# Patient Record
Sex: FEMALE | Race: OTHER | HISPANIC OR LATINO | URBAN - METROPOLITAN AREA
[De-identification: names, ages, dates, MRNs, and addresses within clinical notes are randomized per-mention and may not be internally consistent; named-entity substitution may affect disease eponyms.]

---

## 2017-07-14 ENCOUNTER — EMERGENCY (EMERGENCY)
Facility: HOSPITAL | Age: 32
LOS: 1 days | Discharge: HOME | End: 2017-07-14
Attending: EMERGENCY MEDICINE | Admitting: EMERGENCY MEDICINE
Payer: COMMERCIAL

## 2017-07-14 VITALS
DIASTOLIC BLOOD PRESSURE: 80 MMHG | SYSTOLIC BLOOD PRESSURE: 121 MMHG | OXYGEN SATURATION: 100 % | RESPIRATION RATE: 18 BRPM | HEART RATE: 82 BPM | TEMPERATURE: 98 F

## 2017-07-14 VITALS — WEIGHT: 139.99 LBS | HEIGHT: 61 IN

## 2017-07-14 PROCEDURE — 73560 X-RAY EXAM OF KNEE 1 OR 2: CPT

## 2017-07-14 PROCEDURE — T1013: CPT

## 2017-07-14 PROCEDURE — 99283 EMERGENCY DEPT VISIT LOW MDM: CPT

## 2017-07-14 PROCEDURE — 73560 X-RAY EXAM OF KNEE 1 OR 2: CPT | Mod: 26,RT

## 2017-07-14 RX ORDER — IBUPROFEN 200 MG
1 TABLET ORAL
Qty: 15 | Refills: 0 | OUTPATIENT
Start: 2017-07-14 | End: 2017-07-19

## 2017-07-14 RX ORDER — IBUPROFEN 200 MG
600 TABLET ORAL ONCE
Qty: 0 | Refills: 0 | Status: COMPLETED | OUTPATIENT
Start: 2017-07-14 | End: 2017-07-14

## 2017-07-14 RX ADMIN — Medication 600 MILLIGRAM(S): at 19:36

## 2017-07-14 NOTE — ED STATDOCS - ATTENDING CONTRIBUTION TO CARE
I, Brigitte Mir, performed the initial face to face bedside interview with this patient regarding history of present illness, review of symptoms and relevant past medical, social and family history.  I completed an independent physical examination.  I was the initial provider who evaluated this patient. I have signed out the follow up of any pending tests (i.e. labs, radiological studies) to the ACP.  I have communicated the patient’s plan of care and disposition with the ACP.
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
## 2017-07-14 NOTE — ED STATDOCS - PROGRESS NOTE DETAILS
pt seen and evaluated by intake doctor. agree with intake hpi and pe. x ray reviewed. pt given rx for ibuprofen for pain. for advised to follow up with pmd. pt verbalized understanding and agreement with plan and dx will dc

## 2017-07-14 NOTE — ED STATDOCS - CHPI ED SYMPTOM NEG
no nausea/no LOC, numbness, tingling, HA, SOB, CP or abd pain/no vomiting no LOC, numbness, tingling, SOB, CP or abd pain/no vomiting/no nausea

## 2017-10-11 ENCOUNTER — ASOB RESULT (OUTPATIENT)
Age: 32
End: 2017-10-11

## 2017-10-11 ENCOUNTER — APPOINTMENT (OUTPATIENT)
Dept: ANTEPARTUM | Facility: CLINIC | Age: 32
End: 2017-10-11
Payer: MEDICAID

## 2017-10-11 PROCEDURE — 76830 TRANSVAGINAL US NON-OB: CPT

## 2017-10-11 PROCEDURE — 76857 US EXAM PELVIC LIMITED: CPT

## 2018-07-13 ENCOUNTER — EMERGENCY (EMERGENCY)
Facility: HOSPITAL | Age: 33
LOS: 1 days | Discharge: HOME | End: 2018-07-13
Attending: EMERGENCY MEDICINE
Payer: COMMERCIAL

## 2018-07-13 VITALS
WEIGHT: 138.01 LBS | SYSTOLIC BLOOD PRESSURE: 111 MMHG | OXYGEN SATURATION: 99 % | HEIGHT: 60 IN | HEART RATE: 71 BPM | TEMPERATURE: 98 F | RESPIRATION RATE: 18 BRPM | DIASTOLIC BLOOD PRESSURE: 72 MMHG

## 2018-07-13 LAB
ALBUMIN SERPL ELPH-MCNC: 4.5 G/DL — SIGNIFICANT CHANGE UP (ref 3.3–5.2)
ALP SERPL-CCNC: 52 U/L — SIGNIFICANT CHANGE UP (ref 40–120)
ALT FLD-CCNC: 18 U/L — SIGNIFICANT CHANGE UP
ANION GAP SERPL CALC-SCNC: 13 MMOL/L — SIGNIFICANT CHANGE UP (ref 5–17)
APPEARANCE UR: CLEAR — SIGNIFICANT CHANGE UP
AST SERPL-CCNC: 33 U/L — HIGH
BASOPHILS # BLD AUTO: 0 K/UL — SIGNIFICANT CHANGE UP (ref 0–0.2)
BASOPHILS NFR BLD AUTO: 0.1 % — SIGNIFICANT CHANGE UP (ref 0–2)
BILIRUB SERPL-MCNC: 0.8 MG/DL — SIGNIFICANT CHANGE UP (ref 0.4–2)
BILIRUB UR-MCNC: NEGATIVE — SIGNIFICANT CHANGE UP
BLD GP AB SCN SERPL QL: SIGNIFICANT CHANGE UP
BUN SERPL-MCNC: 15 MG/DL — SIGNIFICANT CHANGE UP (ref 8–20)
CALCIUM SERPL-MCNC: 9.4 MG/DL — SIGNIFICANT CHANGE UP (ref 8.6–10.2)
CHLORIDE SERPL-SCNC: 98 MMOL/L — SIGNIFICANT CHANGE UP (ref 98–107)
CO2 SERPL-SCNC: 23 MMOL/L — SIGNIFICANT CHANGE UP (ref 22–29)
COLOR SPEC: YELLOW — SIGNIFICANT CHANGE UP
CREAT SERPL-MCNC: 0.42 MG/DL — LOW (ref 0.5–1.3)
DIFF PNL FLD: ABNORMAL
EOSINOPHIL # BLD AUTO: 0 K/UL — SIGNIFICANT CHANGE UP (ref 0–0.5)
EOSINOPHIL NFR BLD AUTO: 0.3 % — SIGNIFICANT CHANGE UP (ref 0–6)
EPI CELLS # UR: SIGNIFICANT CHANGE UP
GLUCOSE SERPL-MCNC: 115 MG/DL — SIGNIFICANT CHANGE UP (ref 70–115)
GLUCOSE UR QL: NEGATIVE MG/DL — SIGNIFICANT CHANGE UP
HCG SERPL-ACNC: SIGNIFICANT CHANGE UP MIU/ML
HCT VFR BLD CALC: 39.6 % — SIGNIFICANT CHANGE UP (ref 37–47)
HGB BLD-MCNC: 13.2 G/DL — SIGNIFICANT CHANGE UP (ref 12–16)
KETONES UR-MCNC: ABNORMAL
LEUKOCYTE ESTERASE UR-ACNC: NEGATIVE — SIGNIFICANT CHANGE UP
LYMPHOCYTES # BLD AUTO: 1.7 K/UL — SIGNIFICANT CHANGE UP (ref 1–4.8)
LYMPHOCYTES # BLD AUTO: 20.2 % — SIGNIFICANT CHANGE UP (ref 20–55)
MCHC RBC-ENTMCNC: 29.9 PG — SIGNIFICANT CHANGE UP (ref 27–31)
MCHC RBC-ENTMCNC: 33.3 G/DL — SIGNIFICANT CHANGE UP (ref 32–36)
MCV RBC AUTO: 89.6 FL — SIGNIFICANT CHANGE UP (ref 81–99)
MONOCYTES # BLD AUTO: 0.4 K/UL — SIGNIFICANT CHANGE UP (ref 0–0.8)
MONOCYTES NFR BLD AUTO: 4.3 % — SIGNIFICANT CHANGE UP (ref 3–10)
NEUTROPHILS # BLD AUTO: 6.4 K/UL — SIGNIFICANT CHANGE UP (ref 1.8–8)
NEUTROPHILS NFR BLD AUTO: 74.8 % — HIGH (ref 37–73)
NITRITE UR-MCNC: NEGATIVE — SIGNIFICANT CHANGE UP
PH UR: 6 — SIGNIFICANT CHANGE UP (ref 5–8)
PLATELET # BLD AUTO: 256 K/UL — SIGNIFICANT CHANGE UP (ref 150–400)
POTASSIUM SERPL-MCNC: 4.7 MMOL/L — SIGNIFICANT CHANGE UP (ref 3.5–5.3)
POTASSIUM SERPL-SCNC: 4.7 MMOL/L — SIGNIFICANT CHANGE UP (ref 3.5–5.3)
PROT SERPL-MCNC: 8 G/DL — SIGNIFICANT CHANGE UP (ref 6.6–8.7)
PROT UR-MCNC: NEGATIVE MG/DL — SIGNIFICANT CHANGE UP
RBC # BLD: 4.42 M/UL — SIGNIFICANT CHANGE UP (ref 4.4–5.2)
RBC # FLD: 12.4 % — SIGNIFICANT CHANGE UP (ref 11–15.6)
RBC CASTS # UR COMP ASSIST: SIGNIFICANT CHANGE UP /HPF (ref 0–4)
SODIUM SERPL-SCNC: 134 MMOL/L — LOW (ref 135–145)
SP GR SPEC: 1.02 — SIGNIFICANT CHANGE UP (ref 1.01–1.02)
TYPE + AB SCN PNL BLD: SIGNIFICANT CHANGE UP
UROBILINOGEN FLD QL: NEGATIVE MG/DL — SIGNIFICANT CHANGE UP
WBC # BLD: 8.6 K/UL — SIGNIFICANT CHANGE UP (ref 4.8–10.8)
WBC # FLD AUTO: 8.6 K/UL — SIGNIFICANT CHANGE UP (ref 4.8–10.8)
WBC UR QL: SIGNIFICANT CHANGE UP

## 2018-07-13 PROCEDURE — 80053 COMPREHEN METABOLIC PANEL: CPT

## 2018-07-13 PROCEDURE — 99284 EMERGENCY DEPT VISIT MOD MDM: CPT

## 2018-07-13 PROCEDURE — 76801 OB US < 14 WKS SINGLE FETUS: CPT | Mod: 26

## 2018-07-13 PROCEDURE — T1013: CPT

## 2018-07-13 PROCEDURE — 85027 COMPLETE CBC AUTOMATED: CPT

## 2018-07-13 PROCEDURE — 84702 CHORIONIC GONADOTROPIN TEST: CPT

## 2018-07-13 PROCEDURE — 81001 URINALYSIS AUTO W/SCOPE: CPT

## 2018-07-13 PROCEDURE — 76817 TRANSVAGINAL US OBSTETRIC: CPT | Mod: 26

## 2018-07-13 PROCEDURE — 86900 BLOOD TYPING SEROLOGIC ABO: CPT

## 2018-07-13 PROCEDURE — 86901 BLOOD TYPING SEROLOGIC RH(D): CPT

## 2018-07-13 PROCEDURE — 86850 RBC ANTIBODY SCREEN: CPT

## 2018-07-13 PROCEDURE — 76801 OB US < 14 WKS SINGLE FETUS: CPT

## 2018-07-13 PROCEDURE — 36415 COLL VENOUS BLD VENIPUNCTURE: CPT

## 2018-07-13 PROCEDURE — 76817 TRANSVAGINAL US OBSTETRIC: CPT

## 2018-07-13 NOTE — ED ADULT NURSE NOTE - OBJECTIVE STATEMENT
Patient arrived to ED today with c/o lower abdominal pain, vaginal bleeding and states she is 6 weeks pregnant.

## 2018-07-13 NOTE — ED PROVIDER NOTE - OBJECTIVE STATEMENT
33 year old female with no significant pMH presents with pelvic pain and vaginal bleeding. LMP 6/4, had a positive home pregnancy test, and reports that she has 33 year old female with no significant pMH presents with pelvic pain and vaginal bleeding. LMP 6/4, had a positive home pregnancy test, and reports that she has left pelvic cramping and spotting starting this morning. Sx constant, radiate to her back. No associated vomiting, diarrhea, dysuria, vaginal discharge

## 2018-07-13 NOTE — ED PROVIDER NOTE - CARE PLAN
Principal Discharge DX:	Threatened   Secondary Diagnosis:	Subchorionic hematoma in first trimester, single or unspecified fetus

## 2018-07-13 NOTE — ED PROVIDER NOTE - MEDICAL DECISION MAKING DETAILS
Pt well appearing. RH+, abd soft, no bleeding actively. Informed of findings and stable for outpt gyn f/u

## 2018-07-20 ENCOUNTER — ASOB RESULT (OUTPATIENT)
Age: 33
End: 2018-07-20

## 2018-07-20 ENCOUNTER — APPOINTMENT (OUTPATIENT)
Dept: ANTEPARTUM | Facility: CLINIC | Age: 33
End: 2018-07-20
Payer: MEDICAID

## 2018-07-20 PROCEDURE — 76817 TRANSVAGINAL US OBSTETRIC: CPT

## 2018-08-15 ENCOUNTER — APPOINTMENT (OUTPATIENT)
Dept: ANTEPARTUM | Facility: CLINIC | Age: 33
End: 2018-08-15

## 2018-08-15 ENCOUNTER — APPOINTMENT (OUTPATIENT)
Dept: MATERNAL FETAL MEDICINE | Facility: CLINIC | Age: 33
End: 2018-08-15

## 2018-08-22 ENCOUNTER — EMERGENCY (EMERGENCY)
Facility: HOSPITAL | Age: 33
LOS: 1 days | Discharge: HOME | End: 2018-08-22
Attending: EMERGENCY MEDICINE
Payer: COMMERCIAL

## 2018-08-22 VITALS
TEMPERATURE: 99 F | SYSTOLIC BLOOD PRESSURE: 110 MMHG | OXYGEN SATURATION: 99 % | DIASTOLIC BLOOD PRESSURE: 68 MMHG | HEART RATE: 89 BPM | RESPIRATION RATE: 18 BRPM

## 2018-08-22 VITALS — WEIGHT: 154.1 LBS | HEIGHT: 60 IN

## 2018-08-22 DIAGNOSIS — Z90.721 ACQUIRED ABSENCE OF OVARIES, UNILATERAL: Chronic | ICD-10-CM

## 2018-08-22 LAB
ALBUMIN SERPL ELPH-MCNC: 3.9 G/DL — SIGNIFICANT CHANGE UP (ref 3.3–5.2)
ALP SERPL-CCNC: 52 U/L — SIGNIFICANT CHANGE UP (ref 40–120)
ALT FLD-CCNC: 14 U/L — SIGNIFICANT CHANGE UP
ANION GAP SERPL CALC-SCNC: 14 MMOL/L — SIGNIFICANT CHANGE UP (ref 5–17)
APPEARANCE UR: ABNORMAL
APTT BLD: 25.6 SEC — LOW (ref 27.5–37.4)
AST SERPL-CCNC: 16 U/L — SIGNIFICANT CHANGE UP
BACTERIA # UR AUTO: ABNORMAL
BASOPHILS # BLD AUTO: 0 K/UL — SIGNIFICANT CHANGE UP (ref 0–0.2)
BASOPHILS NFR BLD AUTO: 0.1 % — SIGNIFICANT CHANGE UP (ref 0–2)
BILIRUB SERPL-MCNC: 0.3 MG/DL — LOW (ref 0.4–2)
BILIRUB UR-MCNC: NEGATIVE — SIGNIFICANT CHANGE UP
BUN SERPL-MCNC: 10 MG/DL — SIGNIFICANT CHANGE UP (ref 8–20)
CALCIUM SERPL-MCNC: 9.3 MG/DL — SIGNIFICANT CHANGE UP (ref 8.6–10.2)
CHLORIDE SERPL-SCNC: 99 MMOL/L — SIGNIFICANT CHANGE UP (ref 98–107)
CO2 SERPL-SCNC: 22 MMOL/L — SIGNIFICANT CHANGE UP (ref 22–29)
COLOR SPEC: YELLOW — SIGNIFICANT CHANGE UP
CREAT SERPL-MCNC: 0.37 MG/DL — LOW (ref 0.5–1.3)
DIFF PNL FLD: ABNORMAL
EOSINOPHIL # BLD AUTO: 0 K/UL — SIGNIFICANT CHANGE UP (ref 0–0.5)
EOSINOPHIL NFR BLD AUTO: 0.3 % — SIGNIFICANT CHANGE UP (ref 0–6)
EPI CELLS # UR: ABNORMAL
GLUCOSE SERPL-MCNC: 101 MG/DL — SIGNIFICANT CHANGE UP (ref 70–115)
GLUCOSE UR QL: NEGATIVE MG/DL — SIGNIFICANT CHANGE UP
HCG SERPL-ACNC: SIGNIFICANT CHANGE UP MIU/ML
HCG UR QL: POSITIVE
HCT VFR BLD CALC: 34.9 % — LOW (ref 37–47)
HGB BLD-MCNC: 11.8 G/DL — LOW (ref 12–16)
INR BLD: 0.97 RATIO — SIGNIFICANT CHANGE UP (ref 0.88–1.16)
KETONES UR-MCNC: ABNORMAL
LEUKOCYTE ESTERASE UR-ACNC: NEGATIVE — SIGNIFICANT CHANGE UP
LYMPHOCYTES # BLD AUTO: 1.7 K/UL — SIGNIFICANT CHANGE UP (ref 1–4.8)
LYMPHOCYTES # BLD AUTO: 19.2 % — LOW (ref 20–55)
MCHC RBC-ENTMCNC: 30.3 PG — SIGNIFICANT CHANGE UP (ref 27–31)
MCHC RBC-ENTMCNC: 33.8 G/DL — SIGNIFICANT CHANGE UP (ref 32–36)
MCV RBC AUTO: 89.5 FL — SIGNIFICANT CHANGE UP (ref 81–99)
MONOCYTES # BLD AUTO: 0.7 K/UL — SIGNIFICANT CHANGE UP (ref 0–0.8)
MONOCYTES NFR BLD AUTO: 8.3 % — SIGNIFICANT CHANGE UP (ref 3–10)
NEUTROPHILS # BLD AUTO: 6.3 K/UL — SIGNIFICANT CHANGE UP (ref 1.8–8)
NEUTROPHILS NFR BLD AUTO: 71.5 % — SIGNIFICANT CHANGE UP (ref 37–73)
NITRITE UR-MCNC: NEGATIVE — SIGNIFICANT CHANGE UP
PH UR: 6 — SIGNIFICANT CHANGE UP (ref 5–8)
PLATELET # BLD AUTO: 202 K/UL — SIGNIFICANT CHANGE UP (ref 150–400)
POTASSIUM SERPL-MCNC: 3.7 MMOL/L — SIGNIFICANT CHANGE UP (ref 3.5–5.3)
POTASSIUM SERPL-SCNC: 3.7 MMOL/L — SIGNIFICANT CHANGE UP (ref 3.5–5.3)
PROT SERPL-MCNC: 6.7 G/DL — SIGNIFICANT CHANGE UP (ref 6.6–8.7)
PROT UR-MCNC: NEGATIVE MG/DL — SIGNIFICANT CHANGE UP
PROTHROM AB SERPL-ACNC: 10.7 SEC — SIGNIFICANT CHANGE UP (ref 9.8–12.7)
RBC # BLD: 3.9 M/UL — LOW (ref 4.4–5.2)
RBC # FLD: 13.1 % — SIGNIFICANT CHANGE UP (ref 11–15.6)
RBC CASTS # UR COMP ASSIST: ABNORMAL /HPF (ref 0–4)
SODIUM SERPL-SCNC: 135 MMOL/L — SIGNIFICANT CHANGE UP (ref 135–145)
SP GR SPEC: 1.01 — SIGNIFICANT CHANGE UP (ref 1.01–1.02)
UROBILINOGEN FLD QL: NEGATIVE MG/DL — SIGNIFICANT CHANGE UP
WBC # BLD: 8.8 K/UL — SIGNIFICANT CHANGE UP (ref 4.8–10.8)
WBC # FLD AUTO: 8.8 K/UL — SIGNIFICANT CHANGE UP (ref 4.8–10.8)
WBC UR QL: SIGNIFICANT CHANGE UP

## 2018-08-22 PROCEDURE — 81001 URINALYSIS AUTO W/SCOPE: CPT

## 2018-08-22 PROCEDURE — 85730 THROMBOPLASTIN TIME PARTIAL: CPT

## 2018-08-22 PROCEDURE — 80053 COMPREHEN METABOLIC PANEL: CPT

## 2018-08-22 PROCEDURE — 36415 COLL VENOUS BLD VENIPUNCTURE: CPT

## 2018-08-22 PROCEDURE — 84443 ASSAY THYROID STIM HORMONE: CPT

## 2018-08-22 PROCEDURE — 85027 COMPLETE CBC AUTOMATED: CPT

## 2018-08-22 PROCEDURE — 85610 PROTHROMBIN TIME: CPT

## 2018-08-22 PROCEDURE — 93005 ELECTROCARDIOGRAM TRACING: CPT

## 2018-08-22 PROCEDURE — T1013: CPT

## 2018-08-22 PROCEDURE — 99283 EMERGENCY DEPT VISIT LOW MDM: CPT

## 2018-08-22 PROCEDURE — 87086 URINE CULTURE/COLONY COUNT: CPT

## 2018-08-22 PROCEDURE — 93010 ELECTROCARDIOGRAM REPORT: CPT

## 2018-08-22 PROCEDURE — 84702 CHORIONIC GONADOTROPIN TEST: CPT

## 2018-08-22 PROCEDURE — 99284 EMERGENCY DEPT VISIT MOD MDM: CPT

## 2018-08-22 PROCEDURE — 81025 URINE PREGNANCY TEST: CPT

## 2018-08-22 NOTE — ED ADULT NURSE NOTE - NSIMPLEMENTINTERV_GEN_ALL_ED
Implemented All Universal Safety Interventions:  [place] to call system. Call bell, personal items and telephone within reach. Instruct patient to call for assistance. Room bathroom lighting operational. Non-slip footwear when patient is off stretcher. Physically safe environment: no spills, clutter or unnecessary equipment. Stretcher in lowest position, wheels locked, appropriate side rails in place.

## 2018-08-22 NOTE — ED PROVIDER NOTE - ATTENDING CONTRIBUTION TO CARE
I personally saw the patient with the resident, and completed the key components of the history and physical exam. I then discussed the management plan with the resident.  33 year old female at 11 weeks gestation with twin gestation presents with episode of palpitations, nausea, and lightheadedness which has since resolved. no complaints currently, no abd pain/vag bleeding.  Gen: NAD, well appearing  CV: RRR  Pul: CTA b/l  Abd: Soft, non-distended, non-tender  Neuro: no focal deficits  Pt improved, stable for dc

## 2018-08-22 NOTE — ED PROVIDER NOTE - PLAN OF CARE
To determine etiology of palpitations Cardiac vs metabolic vs anemia  Pending CBC, CMP, UA, Urine Cx, TSH  EKG shows NSR

## 2018-08-22 NOTE — ED PROVIDER NOTE - MEDICAL DECISION MAKING DETAILS
Pt states she feels much better,. HR has reamined in NSr entire time here, well apeparing, labs neg, stable for dc

## 2018-08-22 NOTE — ED PROVIDER NOTE - OBJECTIVE STATEMENT
34 yo female with PMH of left oopherectomy presents at 11 weeks pregnancy with twin gestation presents after an episode of having palpitations, fast heart beat, feeling of throat closes, and weakness. 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 She states she had an episode of NBNB vomiting after this. She also had a right sided headache which last 1 hour but resolved after taking a Tylenol. She said her friend checked her blood glucose via fingerstick at that time which was 156. She states this has never happened before and has not had any complication with her previous pregnancy. She was found to have a subchorionic bleeding at 5 weeks after she began to have vaginal bleeding at 4 weeks. She endorses +fetal movements today and denies any vaginal bleeding or discharge.  She denies any dizziness, LOC, fall, trauma, CP or abd pain.

## 2018-08-22 NOTE — ED PROVIDER NOTE - PHYSICAL EXAMINATION
Vitals  T(C): 37.3 (08-22-18 @ 12:43), Max: 37.3 (08-22-18 @ 12:43)  HR: 92 (08-22-18 @ 12:43) (92 - 92)  BP: 108/67 (08-22-18 @ 12:43) (108/67 - 108/67)  RR: 16 (08-22-18 @ 12:43) (16 - 16)  SpO2: 98% (08-22-18 @ 12:43) (98% - 98%)    Physical Exam:   GENERAL: Pregnant female, NAD, well-groomed, well-developed  HEAD:  Atraumatic, Normocephalic  EYES: EOMI, PERRLA, conjunctiva and sclera clear  ENMT: Moist mucous membranes, Good dentition  NECK: Supple, No JVD, Normal thyroid  NERVOUS SYSTEM:  Alert & Oriented X3, Good concentration; Motor Strength 5/5 B/L upper and lower extremities;   RESP: Clear to auscultation bilaterally; No rales, rhonchi, wheezing, or rubs  CVS: Regular rate and rhythm; No murmurs, rubs, or gallops  GI: Gravid, Soft, Nontender, Bowel sounds present  EXTREMITIES:  2+ Peripheral Pulses, No clubbing, cyanosis, or edema  SKIN: No rashes or lesions

## 2018-08-22 NOTE — ED PROVIDER NOTE - PMH
Medication: Pantoprazole 40 mg PO BID  Last office visit:  11/15/2016  Follow-up:  11/28/2017    Script was eprescribed to pharmacy per Dr. Serrano.       Back pain

## 2018-08-22 NOTE — ED ADULT NURSE NOTE - BOWEL SOUNDS LUQ
Zarxio initiated  daily CBC with diff ordered  if develops fever, will order one dose of vancomycin and present

## 2018-08-22 NOTE — ED PROVIDER NOTE - CARE PLAN
Principal Discharge DX:	Palpitations  Goal:	To determine etiology of palpitations  Assessment and plan of treatment:	Cardiac vs metabolic vs anemia  Pending CBC, CMP, UA, Urine Cx, TSH  EKG shows NSR  Secondary Diagnosis:	11 weeks gestation of pregnancy

## 2018-08-23 LAB
CULTURE RESULTS: SIGNIFICANT CHANGE UP
SPECIMEN SOURCE: SIGNIFICANT CHANGE UP

## 2018-09-12 PROBLEM — M54.9 DORSALGIA, UNSPECIFIED: Chronic | Status: ACTIVE | Noted: 2018-08-22

## 2018-09-25 ENCOUNTER — ASOB RESULT (OUTPATIENT)
Age: 33
End: 2018-09-25

## 2018-09-25 ENCOUNTER — APPOINTMENT (OUTPATIENT)
Dept: ANTEPARTUM | Facility: CLINIC | Age: 33
End: 2018-09-25
Payer: MEDICAID

## 2018-09-25 ENCOUNTER — APPOINTMENT (OUTPATIENT)
Dept: MATERNAL FETAL MEDICINE | Facility: CLINIC | Age: 33
End: 2018-09-25
Payer: MEDICAID

## 2018-09-25 VITALS
BODY MASS INDEX: 31.26 KG/M2 | OXYGEN SATURATION: 98 % | RESPIRATION RATE: 16 BRPM | SYSTOLIC BLOOD PRESSURE: 92 MMHG | DIASTOLIC BLOOD PRESSURE: 58 MMHG | HEIGHT: 60 IN | HEART RATE: 96 BPM | WEIGHT: 159.25 LBS

## 2018-09-25 DIAGNOSIS — O31.21X0 CONTINUING PREGNANCY AFTER INTRAUTERINE DEATH OF ONE FETUS OR MORE, FIRST TRIMESTER, NOT APPLICABLE OR UNSPECIFIED: ICD-10-CM

## 2018-09-25 DIAGNOSIS — O30.049 TWIN PREGNANCY, DICHORIONIC/DIAMNIOTIC, UNSPECIFIED TRIMESTER: ICD-10-CM

## 2018-09-25 DIAGNOSIS — N83.202 UNSPECIFIED OVARIAN CYST, LEFT SIDE: ICD-10-CM

## 2018-09-25 DIAGNOSIS — O99.212 OBESITY COMPLICATING PREGNANCY, SECOND TRIMESTER: ICD-10-CM

## 2018-09-25 DIAGNOSIS — Z87.59 PERSONAL HISTORY OF OTHER COMPLICATIONS OF PREGNANCY, CHILDBIRTH AND THE PUERPERIUM: ICD-10-CM

## 2018-09-25 DIAGNOSIS — Z3A.16 16 WEEKS GESTATION OF PREGNANCY: ICD-10-CM

## 2018-09-25 DIAGNOSIS — Z83.3 FAMILY HISTORY OF DIABETES MELLITUS: ICD-10-CM

## 2018-09-25 PROCEDURE — 76816 OB US FOLLOW-UP PER FETUS: CPT | Mod: 59

## 2018-09-25 PROCEDURE — 99215 OFFICE O/P EST HI 40 MIN: CPT

## 2018-09-25 RX ORDER — VITAMIN C, CALCIUM, IRON, VITAMIN D3, VITAMIN E, VITAMIN B1, VITAMIN B2, VITAMIN B3, VITAMIN B6, FOLIC ACID, IODINE, ZINC, COPPER, DOCUSATE SODIUM, DOCOSAHEXAENOIC ACID (DHA) 27-1-50 MG
KIT ORAL
Refills: 0 | Status: ACTIVE | COMMUNITY

## 2018-09-27 ENCOUNTER — LABORATORY RESULT (OUTPATIENT)
Age: 33
End: 2018-09-27

## 2018-09-27 ENCOUNTER — ASOB RESULT (OUTPATIENT)
Age: 33
End: 2018-09-27

## 2018-09-27 ENCOUNTER — APPOINTMENT (OUTPATIENT)
Dept: MATERNAL FETAL MEDICINE | Facility: CLINIC | Age: 33
End: 2018-09-27
Payer: MEDICAID

## 2018-09-27 ENCOUNTER — EMERGENCY (EMERGENCY)
Facility: HOSPITAL | Age: 33
LOS: 1 days | Discharge: HOME | End: 2018-09-27
Attending: EMERGENCY MEDICINE
Payer: COMMERCIAL

## 2018-09-27 VITALS
SYSTOLIC BLOOD PRESSURE: 114 MMHG | TEMPERATURE: 98 F | HEART RATE: 86 BPM | OXYGEN SATURATION: 97 % | DIASTOLIC BLOOD PRESSURE: 78 MMHG | RESPIRATION RATE: 16 BRPM

## 2018-09-27 VITALS — HEIGHT: 60 IN | WEIGHT: 156.97 LBS

## 2018-09-27 DIAGNOSIS — Z90.721 ACQUIRED ABSENCE OF OVARIES, UNILATERAL: Chronic | ICD-10-CM

## 2018-09-27 LAB
APPEARANCE UR: CLEAR — SIGNIFICANT CHANGE UP
BILIRUB UR-MCNC: NEGATIVE — SIGNIFICANT CHANGE UP
COLOR SPEC: YELLOW — SIGNIFICANT CHANGE UP
DIFF PNL FLD: ABNORMAL
EPI CELLS # UR: SIGNIFICANT CHANGE UP
GLUCOSE UR QL: 250 MG/DL
HCG UR QL: POSITIVE
KETONES UR-MCNC: ABNORMAL
LEUKOCYTE ESTERASE UR-ACNC: NEGATIVE — SIGNIFICANT CHANGE UP
NITRITE UR-MCNC: NEGATIVE — SIGNIFICANT CHANGE UP
PH UR: 6 — SIGNIFICANT CHANGE UP (ref 5–8)
PROT UR-MCNC: 15 MG/DL
RBC CASTS # UR COMP ASSIST: SIGNIFICANT CHANGE UP /HPF (ref 0–4)
SP GR SPEC: 1.02 — SIGNIFICANT CHANGE UP (ref 1.01–1.02)
UROBILINOGEN FLD QL: NEGATIVE MG/DL — SIGNIFICANT CHANGE UP
WBC UR QL: SIGNIFICANT CHANGE UP

## 2018-09-27 PROCEDURE — 81025 URINE PREGNANCY TEST: CPT

## 2018-09-27 PROCEDURE — 99284 EMERGENCY DEPT VISIT MOD MDM: CPT

## 2018-09-27 PROCEDURE — 81001 URINALYSIS AUTO W/SCOPE: CPT

## 2018-09-27 PROCEDURE — 99213 OFFICE O/P EST LOW 20 MIN: CPT | Mod: 25

## 2018-09-27 PROCEDURE — T1013: CPT

## 2018-09-27 NOTE — ED STATDOCS - ATTENDING CONTRIBUTION TO CARE
I, Dr. Horowitz, performed the initial face to face bedside interview with this patient regarding history of present illness, review of symptoms and relevant past medical, social and family history.  I completed an independent physical examination.  I was the initial provider who evaluated this patient. I have signed out the follow up of any pending tests (i.e. labs, radiological studies) to the ACP.  I have communicated the patient’s plan of care and disposition with the ACP.

## 2018-09-27 NOTE — ED ADULT TRIAGE NOTE - CHIEF COMPLAINT QUOTE
pt 16 weeks pregnant, reports on Tuesday she went for a sono and was told one of the twins did not have a heart beat. comes to ED today because of "pounding pain" to pelvic area since last night, associated with chills. denies vaginal bleeding.

## 2018-09-27 NOTE — ED STATDOCS - OBJECTIVE STATEMENT
34 y/o F 16 weeks pregnant with twins,  A2, pt with PMHx L oophorectomy and chronic back pain presents to the ED c/o intermittent abdominal pain for the past few days.  Pt has been having episodes of pain which she describes as pinching, throbbing and tingling and rates the pain as 5/10.  Her current episode began at 3:00am today.  Pt had an appt with her GYN 2 days ago, had an US and was told there was only one heart beat.  She went to her high risk GYN today, had blood work, UA and US done, and was told that if her abdominal pain continues, she should come into the ED.  Pt went home and states her abdominal pain worsened, so she came into the ED.  Pt does not know the results of her workup at the GYN today.  Denies fevers, dysuria, vaginal bleeding, hematuria, HA, back pain, N/V/D.  No further acute complaints at this time.  High risk GYN: Dr. Hutchins 34 y/o F 16 weeks pregnant with twins,  A2, pt with PMHx L oophorectomy and chronic back pain presents to the ED c/o intermittent abdominal pain for the past few days.  Pt had chills last night, has been having episodes of pain which she describes as "pinching, throbbing and tingling" and rates the pain as 5/10.  Her current episode began at 3:00am today.  Pt had an appt with her GYN 2 days ago, had an US and was told there was only one heart beat.  She went to her GYN and high risk GYN today, had blood work, UA and US done, and was told that if her abdominal pain continues, she should come into the ED.  Pt went home and states her abdominal pain worsened, so she came into the ED.  Pt does not know the results of her workup at the GYN today.  Denies fevers, dysuria, vaginal bleeding, hematuria, HA, back pain, N/V/D.  No further acute complaints at this time.  GYN: Dr. Hutchins

## 2018-09-27 NOTE — ED STATDOCS - PROGRESS NOTE DETAILS
Spoke with Dr. Hutchins and discussed pt's case.  He is aware of the pt's story.  Pt has had 2 documentations of fetal heart beat today, and discussed risk of pt loosing second baby.  There is nothing urgent medical to do at this time.  She has a scheduled follow up. Bedside US performed by Dr. Horowitz and CATRACHO Peng, fetal HR documented at 160, with positive fetal motion. results reviewed with pt. PT verbalized understanding of diagnosis and importance of follow up at OBGYN. PT educated on importance of follow up and when to return to the ED.

## 2018-10-05 ENCOUNTER — LABORATORY RESULT (OUTPATIENT)
Age: 33
End: 2018-10-05

## 2018-10-05 ENCOUNTER — ASOB RESULT (OUTPATIENT)
Age: 33
End: 2018-10-05

## 2018-10-05 ENCOUNTER — APPOINTMENT (OUTPATIENT)
Dept: ANTEPARTUM | Facility: CLINIC | Age: 33
End: 2018-10-05
Payer: MEDICAID

## 2018-10-05 ENCOUNTER — APPOINTMENT (OUTPATIENT)
Dept: MATERNAL FETAL MEDICINE | Facility: CLINIC | Age: 33
End: 2018-10-05
Payer: MEDICAID

## 2018-10-05 DIAGNOSIS — O28.0 ABNORMAL HEMATOLOGICAL FINDING ON ANTENATAL SCREENING OF MOTHER: ICD-10-CM

## 2018-10-05 PROCEDURE — 99241 OFFICE CONSULTATION NEW/ESTAB PATIENT 15 MIN: CPT | Mod: 25

## 2018-10-05 PROCEDURE — 99213 OFFICE O/P EST LOW 20 MIN: CPT | Mod: 25

## 2018-10-05 PROCEDURE — 99213 OFFICE O/P EST LOW 20 MIN: CPT

## 2018-10-05 PROCEDURE — 76946 ECHO GUIDE FOR AMNIOCENTESIS: CPT

## 2018-10-05 PROCEDURE — 76815 OB US LIMITED FETUS(S): CPT

## 2018-10-05 PROCEDURE — 59000 AMNIOCENTESIS DIAGNOSTIC: CPT

## 2018-10-22 ENCOUNTER — ASOB RESULT (OUTPATIENT)
Age: 33
End: 2018-10-22

## 2018-10-22 ENCOUNTER — APPOINTMENT (OUTPATIENT)
Dept: ANTEPARTUM | Facility: CLINIC | Age: 33
End: 2018-10-22
Payer: MEDICAID

## 2018-10-22 PROCEDURE — 76811 OB US DETAILED SNGL FETUS: CPT

## 2018-12-24 ENCOUNTER — APPOINTMENT (OUTPATIENT)
Dept: ANTEPARTUM | Facility: CLINIC | Age: 33
End: 2018-12-24
Payer: MEDICAID

## 2018-12-24 ENCOUNTER — ASOB RESULT (OUTPATIENT)
Age: 33
End: 2018-12-24

## 2018-12-24 PROCEDURE — 76816 OB US FOLLOW-UP PER FETUS: CPT

## 2019-02-13 ENCOUNTER — ASOB RESULT (OUTPATIENT)
Age: 34
End: 2019-02-13

## 2019-02-13 ENCOUNTER — APPOINTMENT (OUTPATIENT)
Age: 34
End: 2019-02-13
Payer: MEDICAID

## 2019-02-13 PROCEDURE — 76816 OB US FOLLOW-UP PER FETUS: CPT | Mod: 59

## 2019-02-20 ENCOUNTER — OUTPATIENT (OUTPATIENT)
Dept: OUTPATIENT SERVICES | Facility: HOSPITAL | Age: 34
LOS: 1 days | End: 2019-02-20
Payer: COMMERCIAL

## 2019-02-20 VITALS
DIASTOLIC BLOOD PRESSURE: 58 MMHG | HEART RATE: 86 BPM | SYSTOLIC BLOOD PRESSURE: 107 MMHG | HEIGHT: 60 IN | RESPIRATION RATE: 16 BRPM | TEMPERATURE: 98 F | WEIGHT: 181 LBS

## 2019-02-20 DIAGNOSIS — Z90.721 ACQUIRED ABSENCE OF OVARIES, UNILATERAL: Chronic | ICD-10-CM

## 2019-02-20 DIAGNOSIS — O34.219 MATERNAL CARE FOR UNSPECIFIED TYPE SCAR FROM PREVIOUS CESAREAN DELIVERY: ICD-10-CM

## 2019-02-20 DIAGNOSIS — Z01.818 ENCOUNTER FOR OTHER PREPROCEDURAL EXAMINATION: ICD-10-CM

## 2019-02-20 LAB
APPEARANCE UR: CLEAR — SIGNIFICANT CHANGE UP
BACTERIA # UR AUTO: NEGATIVE — SIGNIFICANT CHANGE UP
BILIRUB UR-MCNC: NEGATIVE — SIGNIFICANT CHANGE UP
BLD GP AB SCN SERPL QL: SIGNIFICANT CHANGE UP
COLOR SPEC: YELLOW — SIGNIFICANT CHANGE UP
DIFF PNL FLD: NEGATIVE — SIGNIFICANT CHANGE UP
EOSINOPHIL # BLD AUTO: 0 K/UL — SIGNIFICANT CHANGE UP (ref 0–0.5)
EOSINOPHIL NFR BLD AUTO: 0.5 % — SIGNIFICANT CHANGE UP (ref 0–6)
EPI CELLS # UR: SIGNIFICANT CHANGE UP
GLUCOSE UR QL: 50 MG/DL
HCT VFR BLD CALC: 37.3 % — SIGNIFICANT CHANGE UP (ref 37–47)
HGB BLD-MCNC: 12.4 G/DL — SIGNIFICANT CHANGE UP (ref 12–16)
HIV 1 & 2 AB SERPL IA.RAPID: SIGNIFICANT CHANGE UP
KETONES UR-MCNC: ABNORMAL
LEUKOCYTE ESTERASE UR-ACNC: NEGATIVE — SIGNIFICANT CHANGE UP
LYMPHOCYTES # BLD AUTO: 1 K/UL — SIGNIFICANT CHANGE UP (ref 1–4.8)
LYMPHOCYTES # BLD AUTO: 15.6 % — LOW (ref 20–55)
MCHC RBC-ENTMCNC: 30.2 PG — SIGNIFICANT CHANGE UP (ref 27–31)
MCHC RBC-ENTMCNC: 33.2 G/DL — SIGNIFICANT CHANGE UP (ref 32–36)
MCV RBC AUTO: 90.8 FL — SIGNIFICANT CHANGE UP (ref 81–99)
MONOCYTES # BLD AUTO: 0.2 K/UL — SIGNIFICANT CHANGE UP (ref 0–0.8)
MONOCYTES NFR BLD AUTO: 3.9 % — SIGNIFICANT CHANGE UP (ref 3–10)
NEUTROPHILS # BLD AUTO: 5.1 K/UL — SIGNIFICANT CHANGE UP (ref 1.8–8)
NEUTROPHILS NFR BLD AUTO: 78.8 % — HIGH (ref 37–73)
NITRITE UR-MCNC: NEGATIVE — SIGNIFICANT CHANGE UP
PH UR: 6.5 — SIGNIFICANT CHANGE UP (ref 5–8)
PLATELET # BLD AUTO: 134 K/UL — LOW (ref 150–400)
PROT UR-MCNC: 15 MG/DL
RBC # BLD: 4.11 M/UL — LOW (ref 4.4–5.2)
RBC # FLD: 14.2 % — SIGNIFICANT CHANGE UP (ref 11–15.6)
RBC CASTS # UR COMP ASSIST: NEGATIVE /HPF — SIGNIFICANT CHANGE UP (ref 0–4)
SP GR SPEC: 1.02 — SIGNIFICANT CHANGE UP (ref 1.01–1.02)
TYPE + AB SCN PNL BLD: SIGNIFICANT CHANGE UP
UROBILINOGEN FLD QL: 1 MG/DL
WBC # BLD: 6.5 K/UL — SIGNIFICANT CHANGE UP (ref 4.8–10.8)
WBC # FLD AUTO: 6.5 K/UL — SIGNIFICANT CHANGE UP (ref 4.8–10.8)
WBC UR QL: SIGNIFICANT CHANGE UP

## 2019-02-20 PROCEDURE — 86780 TREPONEMA PALLIDUM: CPT

## 2019-02-20 PROCEDURE — 86703 HIV-1/HIV-2 1 RESULT ANTBDY: CPT

## 2019-02-20 PROCEDURE — 81001 URINALYSIS AUTO W/SCOPE: CPT

## 2019-02-20 PROCEDURE — 86900 BLOOD TYPING SEROLOGIC ABO: CPT

## 2019-02-20 PROCEDURE — 36415 COLL VENOUS BLD VENIPUNCTURE: CPT

## 2019-02-20 PROCEDURE — 85027 COMPLETE CBC AUTOMATED: CPT

## 2019-02-20 PROCEDURE — 86901 BLOOD TYPING SEROLOGIC RH(D): CPT

## 2019-02-20 PROCEDURE — 86850 RBC ANTIBODY SCREEN: CPT

## 2019-02-20 PROCEDURE — 87389 HIV-1 AG W/HIV-1&-2 AB AG IA: CPT

## 2019-02-21 LAB — HIV 1+2 AB+HIV1 P24 AG SERPL QL IA: SIGNIFICANT CHANGE UP

## 2019-02-22 LAB — T PALLIDUM AB TITR SER: NEGATIVE — SIGNIFICANT CHANGE UP

## 2019-02-26 ENCOUNTER — ASOB RESULT (OUTPATIENT)
Age: 34
End: 2019-02-26

## 2019-02-26 ENCOUNTER — APPOINTMENT (OUTPATIENT)
Dept: ANTEPARTUM | Facility: CLINIC | Age: 34
End: 2019-02-26
Payer: MEDICAID

## 2019-02-26 ENCOUNTER — APPOINTMENT (OUTPATIENT)
Dept: MATERNAL FETAL MEDICINE | Facility: CLINIC | Age: 34
End: 2019-02-26
Payer: MEDICAID

## 2019-02-26 VITALS — WEIGHT: 186 LBS | HEIGHT: 60 IN | BODY MASS INDEX: 36.52 KG/M2

## 2019-02-26 DIAGNOSIS — O24.410 GESTATIONAL DIABETES MELLITUS IN PREGNANCY, DIET CONTROLLED: ICD-10-CM

## 2019-02-26 PROCEDURE — 76818 FETAL BIOPHYS PROFILE W/NST: CPT

## 2019-02-26 PROCEDURE — G0108 DIAB MANAGE TRN  PER INDIV: CPT

## 2019-02-27 LAB — HBA1C MFR BLD HPLC: 5.7 %

## 2019-02-28 ENCOUNTER — TRANSCRIPTION ENCOUNTER (OUTPATIENT)
Age: 34
End: 2019-02-28

## 2019-03-01 ENCOUNTER — INPATIENT (INPATIENT)
Facility: HOSPITAL | Age: 34
LOS: 2 days | Discharge: HOME | End: 2019-03-04
Attending: OBSTETRICS & GYNECOLOGY | Admitting: OBSTETRICS & GYNECOLOGY
Payer: COMMERCIAL

## 2019-03-01 ENCOUNTER — RESULT REVIEW (OUTPATIENT)
Age: 34
End: 2019-03-01

## 2019-03-01 ENCOUNTER — TRANSCRIPTION ENCOUNTER (OUTPATIENT)
Age: 34
End: 2019-03-01

## 2019-03-01 VITALS
SYSTOLIC BLOOD PRESSURE: 110 MMHG | HEART RATE: 76 BPM | HEIGHT: 60 IN | RESPIRATION RATE: 16 BRPM | DIASTOLIC BLOOD PRESSURE: 64 MMHG | TEMPERATURE: 99 F | WEIGHT: 186.07 LBS

## 2019-03-01 DIAGNOSIS — O47.1 FALSE LABOR AT OR AFTER 37 COMPLETED WEEKS OF GESTATION: ICD-10-CM

## 2019-03-01 DIAGNOSIS — O34.219 MATERNAL CARE FOR UNSPECIFIED TYPE SCAR FROM PREVIOUS CESAREAN DELIVERY: ICD-10-CM

## 2019-03-01 DIAGNOSIS — Z90.721 ACQUIRED ABSENCE OF OVARIES, UNILATERAL: Chronic | ICD-10-CM

## 2019-03-01 LAB
BASOPHILS # BLD AUTO: 0 K/UL — SIGNIFICANT CHANGE UP (ref 0–0.2)
BASOPHILS NFR BLD AUTO: 0.2 % — SIGNIFICANT CHANGE UP (ref 0–2)
EOSINOPHIL # BLD AUTO: 0 K/UL — SIGNIFICANT CHANGE UP (ref 0–0.5)
EOSINOPHIL NFR BLD AUTO: 0.6 % — SIGNIFICANT CHANGE UP (ref 0–6)
HCT VFR BLD CALC: 37.7 % — SIGNIFICANT CHANGE UP (ref 37–47)
HGB BLD-MCNC: 12.9 G/DL — SIGNIFICANT CHANGE UP (ref 12–16)
LYMPHOCYTES # BLD AUTO: 1.8 K/UL — SIGNIFICANT CHANGE UP (ref 1–4.8)
LYMPHOCYTES # BLD AUTO: 26.6 % — SIGNIFICANT CHANGE UP (ref 20–55)
MCHC RBC-ENTMCNC: 30.8 PG — SIGNIFICANT CHANGE UP (ref 27–31)
MCHC RBC-ENTMCNC: 34.2 G/DL — SIGNIFICANT CHANGE UP (ref 32–36)
MCV RBC AUTO: 90 FL — SIGNIFICANT CHANGE UP (ref 81–99)
MONOCYTES # BLD AUTO: 0.5 K/UL — SIGNIFICANT CHANGE UP (ref 0–0.8)
MONOCYTES NFR BLD AUTO: 8.2 % — SIGNIFICANT CHANGE UP (ref 3–10)
NEUTROPHILS # BLD AUTO: 4.2 K/UL — SIGNIFICANT CHANGE UP (ref 1.8–8)
NEUTROPHILS NFR BLD AUTO: 63.8 % — SIGNIFICANT CHANGE UP (ref 37–73)
PLATELET # BLD AUTO: 148 K/UL — LOW (ref 150–400)
RBC # BLD: 4.19 M/UL — LOW (ref 4.4–5.2)
RBC # FLD: 14.1 % — SIGNIFICANT CHANGE UP (ref 11–15.6)
WBC # BLD: 6.6 K/UL — SIGNIFICANT CHANGE UP (ref 4.8–10.8)
WBC # FLD AUTO: 6.6 K/UL — SIGNIFICANT CHANGE UP (ref 4.8–10.8)

## 2019-03-01 RX ORDER — SODIUM CHLORIDE 9 MG/ML
1000 INJECTION, SOLUTION INTRAVENOUS ONCE
Qty: 0 | Refills: 0 | Status: COMPLETED | OUTPATIENT
Start: 2019-03-01 | End: 2019-03-01

## 2019-03-01 RX ORDER — KETOROLAC TROMETHAMINE 30 MG/ML
30 SYRINGE (ML) INJECTION EVERY 6 HOURS
Qty: 0 | Refills: 0 | Status: DISCONTINUED | OUTPATIENT
Start: 2019-03-02 | End: 2019-03-04

## 2019-03-01 RX ORDER — METOCLOPRAMIDE HCL 10 MG
10 TABLET ORAL ONCE
Qty: 0 | Refills: 0 | Status: DISCONTINUED | OUTPATIENT
Start: 2019-03-01 | End: 2019-03-01

## 2019-03-01 RX ORDER — SODIUM CHLORIDE 9 MG/ML
1000 INJECTION, SOLUTION INTRAVENOUS
Qty: 0 | Refills: 0 | Status: DISCONTINUED | OUTPATIENT
Start: 2019-03-01 | End: 2019-03-01

## 2019-03-01 RX ORDER — ONDANSETRON 8 MG/1
4 TABLET, FILM COATED ORAL EVERY 6 HOURS
Qty: 0 | Refills: 0 | Status: DISCONTINUED | OUTPATIENT
Start: 2019-03-02 | End: 2019-03-04

## 2019-03-01 RX ORDER — AZITHROMYCIN 500 MG/1
500 TABLET, FILM COATED ORAL ONCE
Qty: 0 | Refills: 0 | Status: DISCONTINUED | OUTPATIENT
Start: 2019-03-01 | End: 2019-03-01

## 2019-03-01 RX ORDER — SODIUM CHLORIDE 9 MG/ML
1000 INJECTION, SOLUTION INTRAVENOUS
Qty: 0 | Refills: 0 | Status: DISCONTINUED | OUTPATIENT
Start: 2019-03-02 | End: 2019-03-04

## 2019-03-01 RX ORDER — CITRIC ACID/SODIUM CITRATE 300-500 MG
30 SOLUTION, ORAL ORAL ONCE
Qty: 0 | Refills: 0 | Status: COMPLETED | OUTPATIENT
Start: 2019-03-01 | End: 2019-03-01

## 2019-03-01 RX ORDER — DIPHENHYDRAMINE HCL 50 MG
25 CAPSULE ORAL ONCE
Qty: 0 | Refills: 0 | Status: DISCONTINUED | OUTPATIENT
Start: 2019-03-02 | End: 2019-03-04

## 2019-03-01 RX ORDER — OXYTOCIN 10 UNIT/ML
333.33 VIAL (ML) INJECTION
Qty: 20 | Refills: 0 | Status: DISCONTINUED | OUTPATIENT
Start: 2019-03-02 | End: 2019-03-04

## 2019-03-01 RX ORDER — ACETAMINOPHEN 500 MG
1000 TABLET ORAL ONCE
Qty: 0 | Refills: 0 | Status: DISCONTINUED | OUTPATIENT
Start: 2019-03-02 | End: 2019-03-04

## 2019-03-01 RX ORDER — DIPHENHYDRAMINE HCL 50 MG
25 CAPSULE ORAL EVERY 6 HOURS
Qty: 0 | Refills: 0 | Status: DISCONTINUED | OUTPATIENT
Start: 2019-03-02 | End: 2019-03-04

## 2019-03-01 RX ORDER — CEFAZOLIN SODIUM 1 G
2000 VIAL (EA) INJECTION ONCE
Qty: 0 | Refills: 0 | Status: COMPLETED | OUTPATIENT
Start: 2019-03-01 | End: 2019-03-01

## 2019-03-01 RX ORDER — ONDANSETRON 8 MG/1
4 TABLET, FILM COATED ORAL ONCE
Qty: 0 | Refills: 0 | Status: DISCONTINUED | OUTPATIENT
Start: 2019-03-02 | End: 2019-03-04

## 2019-03-01 RX ORDER — NALOXONE HYDROCHLORIDE 4 MG/.1ML
0.1 SPRAY NASAL
Qty: 0 | Refills: 0 | Status: DISCONTINUED | OUTPATIENT
Start: 2019-03-02 | End: 2019-03-04

## 2019-03-01 RX ADMIN — Medication 1000 MILLIUNIT(S)/MIN: at 23:37

## 2019-03-01 RX ADMIN — AZITHROMYCIN 255 MILLIGRAM(S): 500 TABLET, FILM COATED ORAL at 23:37

## 2019-03-01 RX ADMIN — SODIUM CHLORIDE 2000 MILLILITER(S): 9 INJECTION, SOLUTION INTRAVENOUS at 22:00

## 2019-03-01 RX ADMIN — Medication 100 MILLIGRAM(S): at 23:15

## 2019-03-01 RX ADMIN — Medication 30 MILLILITER(S): at 22:32

## 2019-03-01 NOTE — OB PROVIDER H&P - ATTENDING COMMENTS
pt presented with ruptured membranes, planned cs date was 3/5/19  pt does not want a btl  risks of surgery discussed  hx of note is a vanishing twin  abx written for c section

## 2019-03-01 NOTE — CHART NOTE - NSCHARTNOTEFT_GEN_A_CORE
Pt presents with grossly ruptured membranes  previous c section  twin pregnancy with vanishing twin, now a espinoza  pt has been getting MFM consultation for the above  pt will be admitted for urgent c section

## 2019-03-01 NOTE — OB PROVIDER H&P - HISTORY OF PRESENT ILLNESS
32yo  at 38 weeks and 4 days by LMP presents to triage grossly ruptured. Denies cxts, VB. +FM.   Pregnancy complicated by:  -vanishing twin  -A1GDM    PMH: GDM  PSH: L oophorectomy 2018  Meds: PNV  Allergies: none  OBhx: 2012 CS at 36 weeks  2007    US: cephalic, anterior placenta    GBS: pos  ABO: O+ 34yo  at 38 weeks and 4 days by LMP presents to triage grossly ruptured. Denies cxts, VB. +FM.   Pregnancy complicated by:  -vanishing twin  -A1GDM    PMH: GDM  PSH: L oophorectomy 2018  Meds: PNV  Allergies: none  OBhx:  CS at 36 weeks  2007    US: cephalic, anterior placenta    GBS: pos  ABO: O+    A/P: 34yo  at 38 weeks and 4 days by LMP presents to triage grossly ruptured.  -Admit to L&D  -Ancef 2g  -Repeat CS

## 2019-03-01 NOTE — OB NEONATOLOGY/PEDIATRICIAN DELIVERY SUMMARY - NSPEDSNEONOTESA_OBGYN_ALL_OB_FT
Miller requested to attend repeat  secondary to SROM by Dr. Hutchins. Infant is a 38.4 week F born to a 35 yo  O+, PNL negative and immune, GBS negative mother. SROM, clear at  (~3 hrs ptd). Pregnancy complicated by borderline GDM, diet controlled, and vanishing twin around 12-13 weeks. Family history noncontributory. Social history denies illicit drug use. Infant born vigorous with spontaneous cry. Routine resuscitation. Apgars 9/9. 3 vessel cord. BWT 3880 g (LGA). PE unremarkable. Transfer to NBN for routine care under management of PMD. Hypoglycemia protocol given h/o GDM and LGA. Miller requested to attend repeat  secondary to SROM by Dr. Hutchins. Infant is a 38.4 week M born to a 35 yo  O+, PNL negative and immune, GBS negative mother. SROM, clear at  (~3 hrs ptd). Pregnancy complicated by borderline GDM, diet controlled, and vanishing twin around 12-13 weeks. Family history noncontributory. Social history denies illicit drug use. Infant born vigorous with spontaneous cry. Routine resuscitation. Apgars 9/9. 3 vessel cord. BWT 3880 g (LGA). PE unremarkable. Transfer to NBN for routine care under management of PMD. Hypoglycemia protocol given h/o GDM and LGA.

## 2019-03-02 LAB
BLD GP AB SCN SERPL QL: SIGNIFICANT CHANGE UP
EOSINOPHIL # BLD AUTO: 0 K/UL — SIGNIFICANT CHANGE UP (ref 0–0.5)
EOSINOPHIL NFR BLD AUTO: 0.1 % — SIGNIFICANT CHANGE UP (ref 0–6)
HCT VFR BLD CALC: 34.8 % — LOW (ref 37–47)
HGB BLD-MCNC: 11.6 G/DL — LOW (ref 12–16)
LYMPHOCYTES # BLD AUTO: 0.7 K/UL — LOW (ref 1–4.8)
LYMPHOCYTES # BLD AUTO: 7.1 % — LOW (ref 20–55)
MCHC RBC-ENTMCNC: 30.9 PG — SIGNIFICANT CHANGE UP (ref 27–31)
MCHC RBC-ENTMCNC: 33.3 G/DL — SIGNIFICANT CHANGE UP (ref 32–36)
MCV RBC AUTO: 92.8 FL — SIGNIFICANT CHANGE UP (ref 81–99)
MONOCYTES # BLD AUTO: 0.4 K/UL — SIGNIFICANT CHANGE UP (ref 0–0.8)
MONOCYTES NFR BLD AUTO: 4.2 % — SIGNIFICANT CHANGE UP (ref 3–10)
NEUTROPHILS # BLD AUTO: 8.6 K/UL — HIGH (ref 1.8–8)
NEUTROPHILS NFR BLD AUTO: 88.1 % — HIGH (ref 37–73)
PLATELET # BLD AUTO: 124 K/UL — LOW (ref 150–400)
RBC # BLD: 3.75 M/UL — LOW (ref 4.4–5.2)
RBC # FLD: 13.9 % — SIGNIFICANT CHANGE UP (ref 11–15.6)
TYPE + AB SCN PNL BLD: SIGNIFICANT CHANGE UP
WBC # BLD: 9.7 K/UL — SIGNIFICANT CHANGE UP (ref 4.8–10.8)
WBC # FLD AUTO: 9.7 K/UL — SIGNIFICANT CHANGE UP (ref 4.8–10.8)

## 2019-03-02 PROCEDURE — 88304 TISSUE EXAM BY PATHOLOGIST: CPT | Mod: 26

## 2019-03-02 RX ORDER — LANOLIN
1 OINTMENT (GRAM) TOPICAL
Qty: 0 | Refills: 0 | Status: DISCONTINUED | OUTPATIENT
Start: 2019-03-02 | End: 2019-03-04

## 2019-03-02 RX ORDER — KETOROLAC TROMETHAMINE 30 MG/ML
30 SYRINGE (ML) INJECTION ONCE
Qty: 0 | Refills: 0 | Status: DISCONTINUED | OUTPATIENT
Start: 2019-03-02 | End: 2019-03-02

## 2019-03-02 RX ORDER — OXYTOCIN 10 UNIT/ML
41.67 VIAL (ML) INJECTION
Qty: 20 | Refills: 0 | Status: DISCONTINUED | OUTPATIENT
Start: 2019-03-02 | End: 2019-03-02

## 2019-03-02 RX ORDER — FERROUS SULFATE 325(65) MG
325 TABLET ORAL DAILY
Qty: 0 | Refills: 0 | Status: DISCONTINUED | OUTPATIENT
Start: 2019-03-02 | End: 2019-03-04

## 2019-03-02 RX ORDER — ENOXAPARIN SODIUM 100 MG/ML
40 INJECTION SUBCUTANEOUS EVERY 24 HOURS
Qty: 0 | Refills: 0 | Status: DISCONTINUED | OUTPATIENT
Start: 2019-03-02 | End: 2019-03-04

## 2019-03-02 RX ORDER — OXYCODONE AND ACETAMINOPHEN 5; 325 MG/1; MG/1
2 TABLET ORAL EVERY 6 HOURS
Qty: 0 | Refills: 0 | Status: DISCONTINUED | OUTPATIENT
Start: 2019-03-02 | End: 2019-03-04

## 2019-03-02 RX ORDER — TETANUS TOXOID, REDUCED DIPHTHERIA TOXOID AND ACELLULAR PERTUSSIS VACCINE, ADSORBED 5; 2.5; 8; 8; 2.5 [IU]/.5ML; [IU]/.5ML; UG/.5ML; UG/.5ML; UG/.5ML
0.5 SUSPENSION INTRAMUSCULAR ONCE
Qty: 0 | Refills: 0 | Status: COMPLETED | OUTPATIENT
Start: 2019-03-02

## 2019-03-02 RX ORDER — OXYCODONE AND ACETAMINOPHEN 5; 325 MG/1; MG/1
1 TABLET ORAL
Qty: 0 | Refills: 0 | Status: DISCONTINUED | OUTPATIENT
Start: 2019-03-02 | End: 2019-03-04

## 2019-03-02 RX ORDER — DOCUSATE SODIUM 100 MG
100 CAPSULE ORAL
Qty: 0 | Refills: 0 | Status: DISCONTINUED | OUTPATIENT
Start: 2019-03-02 | End: 2019-03-04

## 2019-03-02 RX ORDER — DIPHENHYDRAMINE HCL 50 MG
25 CAPSULE ORAL EVERY 6 HOURS
Qty: 0 | Refills: 0 | Status: DISCONTINUED | OUTPATIENT
Start: 2019-03-02 | End: 2019-03-04

## 2019-03-02 RX ORDER — INFLUENZA VIRUS VACCINE 15; 15; 15; 15 UG/.5ML; UG/.5ML; UG/.5ML; UG/.5ML
0.5 SUSPENSION INTRAMUSCULAR ONCE
Qty: 0 | Refills: 0 | Status: COMPLETED | OUTPATIENT
Start: 2019-03-02 | End: 2019-03-03

## 2019-03-02 RX ORDER — AZITHROMYCIN 500 MG/1
500 TABLET, FILM COATED ORAL ONCE
Qty: 0 | Refills: 0 | Status: COMPLETED | OUTPATIENT
Start: 2019-03-02 | End: 2019-03-01

## 2019-03-02 RX ORDER — GLYCERIN ADULT
1 SUPPOSITORY, RECTAL RECTAL AT BEDTIME
Qty: 0 | Refills: 0 | Status: DISCONTINUED | OUTPATIENT
Start: 2019-03-02 | End: 2019-03-04

## 2019-03-02 RX ORDER — OXYTOCIN 10 UNIT/ML
41.67 VIAL (ML) INJECTION
Qty: 20 | Refills: 0 | Status: DISCONTINUED | OUTPATIENT
Start: 2019-03-02 | End: 2019-03-04

## 2019-03-02 RX ORDER — SIMETHICONE 80 MG/1
80 TABLET, CHEWABLE ORAL EVERY 4 HOURS
Qty: 0 | Refills: 0 | Status: DISCONTINUED | OUTPATIENT
Start: 2019-03-02 | End: 2019-03-04

## 2019-03-02 RX ORDER — SODIUM CHLORIDE 9 MG/ML
1000 INJECTION, SOLUTION INTRAVENOUS
Qty: 0 | Refills: 0 | Status: DISCONTINUED | OUTPATIENT
Start: 2019-03-02 | End: 2019-03-04

## 2019-03-02 RX ORDER — IBUPROFEN 200 MG
600 TABLET ORAL EVERY 6 HOURS
Qty: 0 | Refills: 0 | Status: DISCONTINUED | OUTPATIENT
Start: 2019-03-02 | End: 2019-03-04

## 2019-03-02 RX ADMIN — Medication 1 TABLET(S): at 16:20

## 2019-03-02 RX ADMIN — ENOXAPARIN SODIUM 40 MILLIGRAM(S): 100 INJECTION SUBCUTANEOUS at 16:19

## 2019-03-02 RX ADMIN — Medication 30 MILLIGRAM(S): at 16:36

## 2019-03-02 RX ADMIN — SIMETHICONE 80 MILLIGRAM(S): 80 TABLET, CHEWABLE ORAL at 16:20

## 2019-03-02 RX ADMIN — Medication 600 MILLIGRAM(S): at 23:37

## 2019-03-02 RX ADMIN — Medication 30 MILLIGRAM(S): at 16:21

## 2019-03-02 RX ADMIN — Medication 100 MILLIGRAM(S): at 16:20

## 2019-03-02 RX ADMIN — Medication 30 MILLIGRAM(S): at 05:39

## 2019-03-02 RX ADMIN — Medication 125 MILLIUNIT(S)/MIN: at 00:21

## 2019-03-02 RX ADMIN — ONDANSETRON 4 MILLIGRAM(S): 8 TABLET, FILM COATED ORAL at 05:24

## 2019-03-02 RX ADMIN — Medication 30 MILLIGRAM(S): at 05:24

## 2019-03-02 RX ADMIN — Medication 325 MILLIGRAM(S): at 16:21

## 2019-03-02 NOTE — DISCHARGE NOTE OB - HOSPITAL COURSE
Pt is 34yo  s/p repeat  section. She was transferred to postpartum unit without complications during her stay. Upon discharge she is voiding, tolerating PO, ambulating, and pain is well controlled.

## 2019-03-02 NOTE — DISCHARGE NOTE OB - MEDICATION SUMMARY - MEDICATIONS TO TAKE
I will START or STAY ON the medications listed below when I get home from the hospital:    ibuprofen 600 mg oral tablet  -- 1 tab(s) by mouth every 6 hours, As Needed  for pain   -- Do not take this drug if you are pregnant.  It is very important that you take or use this exactly as directed.  Do not skip doses or discontinue unless directed by your doctor.  May cause drowsiness or dizziness.  Obtain medical advice before taking any non-prescription drugs as some may affect the action of this medication.  Take with food or milk.    -- Indication: For Mild pain    oxycodone-acetaminophen 5 mg-325 mg oral tablet  -- 1 tab(s) by mouth every 6 hours, As Needed -Severe Pain (7 - 10) MDD:4 tabs  -- Indication: For Mod pain    Prenatal Multivitamins with Folic Acid 1 mg oral tablet  -- 1 tab(s) by mouth once a day  -- Indication: For vitamins    docusate sodium 100 mg oral capsule  -- 1 cap(s) by mouth 2 times a day, As needed, Stool Softening  -- Indication: For constipation

## 2019-03-02 NOTE — DISCHARGE NOTE OB - CARE PLAN
Principal Discharge DX:	 delivery delivered  Goal:	rapid recovery  Assessment and plan of treatment:	Please call your provider in 1-2 weeks for wound check. Take medications as directed, regular diet, activity as tolerated. Exclusive breast feeding for the first 6 months is recommended. Nothing per vagina for 6 weeks (incl. sex, douching, etc). If you have additional concerns, please inform your provider.

## 2019-03-02 NOTE — PROGRESS NOTE ADULT - SUBJECTIVE AND OBJECTIVE BOX
INTERVAL HPI/OVERNIGHT EVENTS:  33y Female s/p c section under spinal anesthesia with duramorph for post op analgesia on 3/2/19    Vital Signs Last 24 Hrs  T(C): 36.9 (02 Mar 2019 16:01), Max: 37.1 (01 Mar 2019 21:53)  T(F): 98.4 (02 Mar 2019 16:01), Max: 98.8 (01 Mar 2019 21:53)  HR: 106 (02 Mar 2019 16:01) (70 - 106)  BP: 96/58 (02 Mar 2019 16:01) (96/58 - 110/64)  BP(mean): --  RR: 20 (02 Mar 2019 16:01) (14 - 20)  SpO2: 99% (02 Mar 2019 06:35) (98% - 100%)    Patient seen, doing well, no anesthetic complications or complaints noted or reported.  Pain is controlled.

## 2019-03-02 NOTE — DISCHARGE NOTE OB - PATIENT PORTAL LINK FT
You can access the [facility] Patient Portal, offered by [facility], by registering with the following website: [email or web address]

## 2019-03-02 NOTE — DISCHARGE NOTE OB - PLAN OF CARE
rapid recovery Please call your provider in 1-2 weeks for wound check. Take medications as directed, regular diet, activity as tolerated. Exclusive breast feeding for the first 6 months is recommended. Nothing per vagina for 6 weeks (incl. sex, douching, etc). If you have additional concerns, please inform your provider.

## 2019-03-02 NOTE — DISCHARGE NOTE OB - CARE PROVIDER_API CALL
Carli Hutchins)  Obstetrics and Gynecology  [address], [address]  [place] [address]  Phone: [phone]  Fax: [phone]  Follow Up Time:

## 2019-03-02 NOTE — PROGRESS NOTE ADULT - SUBJECTIVE AND OBJECTIVE BOX
Postpartum Note,  Section  Patient is a 33y  s/p repeat  post-operative day 1.    Subjective:  PT vomited overnight twice and was feeling nausiated, got zofran, did not eat since  Pt also complains of feeling heat in her head    Patient is having normal postpartum bleeding which is decreasing in amount.    She is breastfeeding and the baby is latching on.    Urinating appropriately into vicente.   -BM/+flatus.    Physical exam:    Vital Signs Last 24 Hrs  T(C): 36.6 (02 Mar 2019 06:35), Max: 37.1 (01 Mar 2019 21:53)  T(F): 97.9 (02 Mar 2019 06:35), Max: 98.8 (01 Mar 2019 21:53)  HR: 84 (02 Mar 2019 06:35) (70 - 105)  BP: 104/65 (02 Mar 2019 06:35) (97/65 - 110/64)  RR: 20 (02 Mar 2019 06:35) (14 - 20)  SpO2: 99% (02 Mar 2019 06:35) (98% - 100%)    Heart: RRR  Lungs: CTABL  Breast: non tender, not engorged   Abdomen: Soft, nontender, no distension, firm uterine fundus, the dressing is removed, the incision is clean dry and intact  Ext: No DVT signs, warm extremities    LABS:                        12.9   6.6   )-----------( 148      ( 01 Mar 2019 22:29 )             37.7

## 2019-03-03 LAB — T PALLIDUM AB TITR SER: NEGATIVE — SIGNIFICANT CHANGE UP

## 2019-03-03 RX ORDER — IBUPROFEN 200 MG
1 TABLET ORAL
Qty: 28 | Refills: 0 | OUTPATIENT
Start: 2019-03-03 | End: 2019-03-09

## 2019-03-03 RX ORDER — DOCUSATE SODIUM 100 MG
1 CAPSULE ORAL
Qty: 60 | Refills: 0 | OUTPATIENT
Start: 2019-03-03 | End: 2019-04-01

## 2019-03-03 RX ORDER — TETANUS TOXOID, REDUCED DIPHTHERIA TOXOID AND ACELLULAR PERTUSSIS VACCINE, ADSORBED 5; 2.5; 8; 8; 2.5 [IU]/.5ML; [IU]/.5ML; UG/.5ML; UG/.5ML; UG/.5ML
0.5 SUSPENSION INTRAMUSCULAR ONCE
Qty: 0 | Refills: 0 | Status: COMPLETED | OUTPATIENT
Start: 2019-03-03 | End: 2019-03-03

## 2019-03-03 RX ORDER — MAGNESIUM HYDROXIDE 400 MG/1
30 TABLET, CHEWABLE ORAL DAILY
Qty: 0 | Refills: 0 | Status: DISCONTINUED | OUTPATIENT
Start: 2019-03-03 | End: 2019-03-04

## 2019-03-03 RX ADMIN — Medication 600 MILLIGRAM(S): at 14:11

## 2019-03-03 RX ADMIN — Medication 600 MILLIGRAM(S): at 22:09

## 2019-03-03 RX ADMIN — SIMETHICONE 80 MILLIGRAM(S): 80 TABLET, CHEWABLE ORAL at 08:06

## 2019-03-03 RX ADMIN — INFLUENZA VIRUS VACCINE 0.5 MILLILITER(S): 15; 15; 15; 15 SUSPENSION INTRAMUSCULAR at 22:46

## 2019-03-03 RX ADMIN — OXYCODONE AND ACETAMINOPHEN 2 TABLET(S): 5; 325 TABLET ORAL at 15:41

## 2019-03-03 RX ADMIN — SIMETHICONE 80 MILLIGRAM(S): 80 TABLET, CHEWABLE ORAL at 13:41

## 2019-03-03 RX ADMIN — Medication 600 MILLIGRAM(S): at 00:00

## 2019-03-03 RX ADMIN — Medication 600 MILLIGRAM(S): at 22:56

## 2019-03-03 RX ADMIN — Medication 100 MILLIGRAM(S): at 22:09

## 2019-03-03 RX ADMIN — OXYCODONE AND ACETAMINOPHEN 2 TABLET(S): 5; 325 TABLET ORAL at 08:07

## 2019-03-03 RX ADMIN — TETANUS TOXOID, REDUCED DIPHTHERIA TOXOID AND ACELLULAR PERTUSSIS VACCINE, ADSORBED 0.5 MILLILITER(S): 5; 2.5; 8; 8; 2.5 SUSPENSION INTRAMUSCULAR at 22:46

## 2019-03-03 RX ADMIN — Medication 100 MILLIGRAM(S): at 06:18

## 2019-03-03 RX ADMIN — Medication 600 MILLIGRAM(S): at 06:50

## 2019-03-03 RX ADMIN — Medication 600 MILLIGRAM(S): at 06:16

## 2019-03-03 RX ADMIN — Medication 325 MILLIGRAM(S): at 13:42

## 2019-03-03 RX ADMIN — OXYCODONE AND ACETAMINOPHEN 2 TABLET(S): 5; 325 TABLET ORAL at 08:37

## 2019-03-03 RX ADMIN — ENOXAPARIN SODIUM 40 MILLIGRAM(S): 100 INJECTION SUBCUTANEOUS at 13:42

## 2019-03-03 RX ADMIN — MAGNESIUM HYDROXIDE 30 MILLILITER(S): 400 TABLET, CHEWABLE ORAL at 13:00

## 2019-03-03 RX ADMIN — Medication 1 TABLET(S): at 13:42

## 2019-03-03 RX ADMIN — OXYCODONE AND ACETAMINOPHEN 2 TABLET(S): 5; 325 TABLET ORAL at 16:11

## 2019-03-03 RX ADMIN — Medication 600 MILLIGRAM(S): at 13:41

## 2019-03-03 NOTE — CHART NOTE - NSCHARTNOTEFT_GEN_A_CORE
Provider called to assess and change patient's saturated steristrips.  Pt is a POD #2 after rCS. Skin was closed subQ with sutures and steristrips.  Uterine fundus firm.  Incision is clean and intact. Steristrips saturated with serosanguinous fluid.  Steris replaced.    T(C): 36.7 (03-03-19 @ 19:00), Max: 37.1 (03-03-19 @ 08:21)  T(F): 98 (03-03-19 @ 19:00), Max: 98.7 (03-03-19 @ 08:21)  HR: 80 (03-03-19 @ 19:00) (80 - 92)  BP: 105/68 (03-03-19 @ 19:00) (102/64 - 105/68)  RR: 20 (03-03-19 @ 19:00) (20 - 20)  SpO2: 99% (03-03-19 @ 19:00) (99% - 99%)    D/W Dr. Hutchins

## 2019-03-03 NOTE — PROGRESS NOTE ADULT - SUBJECTIVE AND OBJECTIVE BOX
Postpartum Note Vaginal Delivery  Patient is a 32yo s/p  day 1.    Subjective:  No acute events overnight.   Patient is tolerating diet and denies N/V.   Patient still has slight vaginal bleeding which is decreasing in amount.   She is not breastfeeding   Urinating appropriately.   -BM/+flatus.    Physical exam:  Vital Signs Last 24 Hrs  T(C): 36.4 (02 Mar 2019 19:25), Max: 36.9 (02 Mar 2019 16:01)  T(F): 97.6 (02 Mar 2019 19:25), Max: 98.4 (02 Mar 2019 16:01)  HR: 92 (02 Mar 2019 19:25) (84 - 106)  BP: 102/58 (02 Mar 2019 19:25) (96/58 - 103/64)  RR: 20 (02 Mar 2019 19:25) (20 - 20)    Heart: RRR  Lungs: CTABL  Breast: non tender, not engorged   Abdomen: Soft, nontender, no distension, firm uterine fundus  Ext: No DVT signs, warm extremities    LABS:                        11.6   9.7   )-----------( 124      ( 02 Mar 2019 08:05 )             34.8 Postpartum Note Vaginal Delivery  Patient is a 32yo s/p repeat  section day 2.    Subjective:  No acute events overnight.   Patient is tolerating diet and denies N/V.   Patient still has slight vaginal bleeding which is decreasing in amount.   She is not breastfeeding   Urinating appropriately.   -BM/+flatus.    Physical exam:  Vital Signs Last 24 Hrs  T(C): 36.4 (02 Mar 2019 19:25), Max: 36.9 (02 Mar 2019 16:01)  T(F): 97.6 (02 Mar 2019 19:25), Max: 98.4 (02 Mar 2019 16:01)  HR: 92 (02 Mar 2019 19:25) (84 - 106)  BP: 102/58 (02 Mar 2019 19:25) (96/58 - 103/64)  RR: 20 (02 Mar 2019 19:25) (20 - 20)    Heart: RRR  Lungs: CTABL  Breast: non tender, not engorged   Abdomen: Soft, nontender, no distension, firm uterine fundus  Ext: No DVT signs, warm extremities    LABS:                        11.6   9.7   )-----------( 124      ( 02 Mar 2019 08:05 )             34.8

## 2019-03-03 NOTE — PROGRESS NOTE ADULT - SUBJECTIVE AND OBJECTIVE BOX
Postpartum Note,  Section  She is a  33y woman who is now post-operative day 2    Subjective:  The patient feels well.  She is ambulating and tolerating a diet  She is having flatus  She reports no breathing problems  She reports no headache or visual changes  She reports normal postpartum bleeding    Physical exam:  She generally looks and feels well    Vital Signs Last 24 Hrs  T(C): 37.1 (03 Mar 2019 08:21), Max: 37.1 (03 Mar 2019 08:21)  T(F): 98.7 (03 Mar 2019 08:21), Max: 98.7 (03 Mar 2019 08:21)  HR: 92 (03 Mar 2019 08:21) (92 - 106)  BP: 102/64 (03 Mar 2019 08:21) (96/58 - 102/64)  BP(mean): --  RR: 20 (03 Mar 2019 08:21) (20 - 20)  SpO2: --    Abdomen: Soft, nontender, no distension , firm uterine fundus, the incision is clean dry and intact  Pelvic: Normal lochia noted  Ext: No DVT signs, warm extremities, normal pulses    LABS:                        11.6   9.7   )-----------( 124      ( 02 Mar 2019 08:05 )             34.8                 Allergies    No Known Allergies    Intolerances      MEDICATIONS  (STANDING):  diphtheria/tetanus/pertussis (acellular) Vaccine (ADAcel) 0.5 milliLiter(s) IntraMuscular once  enoxaparin Injectable 40 milliGRAM(s) SubCutaneous every 24 hours  ferrous    sulfate 325 milliGRAM(s) Oral daily  influenza   Vaccine 0.5 milliLiter(s) IntraMuscular once  lactated ringers. 1000 milliLiter(s) (125 mL/Hr) IV Continuous <Continuous>  lactated ringers. 1000 milliLiter(s) (125 mL/Hr) IV Continuous <Continuous>  oxytocin Infusion 41.667 milliUNIT(s)/Min (125 mL/Hr) IV Continuous <Continuous>  oxytocin Infusion 333.333 milliUNIT(s)/Min (1000 mL/Hr) IV Continuous <Continuous>  prenatal multivitamin 1 Tablet(s) Oral daily    MEDICATIONS  (PRN):  acetaminophen  IVPB .. 1000 milliGRAM(s) IV Intermittent once PRN Moderate Pain (4 - 6)  acetaminophen  IVPB .. 1000 milliGRAM(s) IV Intermittent once PRN Moderate Pain (4 - 6)  diphenhydrAMINE 25 milliGRAM(s) Oral every 6 hours PRN Pruritus  diphenhydrAMINE 25 milliGRAM(s) Oral every 6 hours PRN Itching  diphenhydrAMINE   Injectable 25 milliGRAM(s) IV Push once PRN Itching  docusate sodium 100 milliGRAM(s) Oral two times a day PRN Stool Softening  glycerin Suppository - Adult 1 Suppository(s) Rectal at bedtime PRN Constipation  ibuprofen  Tablet. 600 milliGRAM(s) Oral every 6 hours PRN Mild Pain (1 - 3)  ketorolac   Injectable 30 milliGRAM(s) IV Push every 6 hours PRN Moderate Pain (4 - 6)  lanolin Ointment 1 Application(s) Topical every 3 hours PRN Sore Nipples  magnesium hydroxide Suspension 30 milliLiter(s) Oral daily PRN Constipation  naloxone Injectable 0.1 milliGRAM(s) IV Push every 3 minutes PRN For ANY of the following changes in patient status:  A. RR LESS THAN 10 breaths per minute, B. Oxygen saturation LESS THAN 90%, C. Sedation score of 6  ondansetron Injectable 4 milliGRAM(s) IV Push every 6 hours PRN Nausea  ondansetron Injectable 4 milliGRAM(s) IV Push once PRN Postoperative Nausea and  Vomiting  oxyCODONE    5 mG/acetaminophen 325 mG 1 Tablet(s) Oral every 3 hours PRN Moderate Pain (4 - 6)  oxyCODONE    5 mG/acetaminophen 325 mG 2 Tablet(s) Oral every 6 hours PRN Severe Pain (7 - 10)  simethicone 80 milliGRAM(s) Chew every 4 hours PRN Gas      RADIOLOGY & ADDITIONAL TESTS:

## 2019-03-04 VITALS
DIASTOLIC BLOOD PRESSURE: 66 MMHG | TEMPERATURE: 98 F | OXYGEN SATURATION: 97 % | HEART RATE: 83 BPM | SYSTOLIC BLOOD PRESSURE: 102 MMHG | RESPIRATION RATE: 19 BRPM

## 2019-03-04 PROCEDURE — 90686 IIV4 VACC NO PRSV 0.5 ML IM: CPT

## 2019-03-04 PROCEDURE — 59050 FETAL MONITOR W/REPORT: CPT

## 2019-03-04 PROCEDURE — 86850 RBC ANTIBODY SCREEN: CPT

## 2019-03-04 PROCEDURE — 59025 FETAL NON-STRESS TEST: CPT

## 2019-03-04 PROCEDURE — 86900 BLOOD TYPING SEROLOGIC ABO: CPT

## 2019-03-04 PROCEDURE — T1013: CPT

## 2019-03-04 PROCEDURE — 90715 TDAP VACCINE 7 YRS/> IM: CPT

## 2019-03-04 PROCEDURE — 85027 COMPLETE CBC AUTOMATED: CPT

## 2019-03-04 PROCEDURE — 88304 TISSUE EXAM BY PATHOLOGIST: CPT

## 2019-03-04 PROCEDURE — 36415 COLL VENOUS BLD VENIPUNCTURE: CPT

## 2019-03-04 PROCEDURE — G0463: CPT

## 2019-03-04 PROCEDURE — 86901 BLOOD TYPING SEROLOGIC RH(D): CPT

## 2019-03-04 PROCEDURE — 86780 TREPONEMA PALLIDUM: CPT

## 2019-03-04 RX ADMIN — Medication 600 MILLIGRAM(S): at 04:57

## 2019-03-04 RX ADMIN — Medication 600 MILLIGRAM(S): at 04:00

## 2019-03-04 NOTE — PROGRESS NOTE ADULT - SUBJECTIVE AND OBJECTIVE BOX
Postpartum Note,  Section  Patient is a 33y  s/p repeat  post-operative day 3.    Subjective:  No acute events overnight. The patient is feeling well.   She is tolerating a diet and denies N/V.    Patient is having normal postpartum bleeding which is decreasing in amount.    She is not breastfeeding.  Urinating appropriately.   -BM/+flatus.    Physical exam:    Vital Signs Last 24 Hrs  T(C): 36.7 (03 Mar 2019 19:00), Max: 37.1 (03 Mar 2019 08:21)  T(F): 98 (03 Mar 2019 19:00), Max: 98.7 (03 Mar 2019 08:21)  HR: 80 (03 Mar 2019 19:00) (80 - 92)  BP: 105/68 (03 Mar 2019 19:00) (102/64 - 105/68)  RR: 20 (03 Mar 2019 19:00) (20 - 20)  SpO2: 99% (03 Mar 2019 19:00) (99% - 99%)    Heart: RRR  Lungs: CTABL  Breast: non tender, not engorged   Abdomen: Soft, nontender, no distension, firm uterine fundus, the incision is clean dry and intact  Ext: No DVT signs, warm extremities    LABS:                        11.6   9.7   )-----------( 124      ( 02 Mar 2019 08:05 )             34.8

## 2019-03-04 NOTE — PROGRESS NOTE ADULT - ASSESSMENT
A/P   Section Day: 3  Patient is feeling well overall.     Continue the current pain medication.   Encourage ambulation and regular diet.   Continue DVT ppx  Plan to discharge today according to the normal criteria.
A/P   Section Day: 1  Patient is feeling well overall.     Continue the current pain medication.   Encourage ambulation and regular diet.   Continue DVT ppx  Plan to discharge on day 3 or 4 according to the normal criteria.
A/P  Patient is s/p  day# 1  Patient is feeling well and reports no issues.     Continue the current pain medication.   Encourage ambulation and a regular diet.   Discharge according to the normal criteria.
assessment and plan    post op care  see orders

## 2019-03-05 LAB — SURGICAL PATHOLOGY STUDY: SIGNIFICANT CHANGE UP

## 2019-03-16 NOTE — ED ADULT NURSE NOTE - OBJECTIVE STATEMENT
[place] to call system/Instruct patient to call for assistance/Stretcher in lowest position, wheels locked, appropriate side rails in place/Provide visual cue, wrist band, yellow gown, etc./Monitor gait and stability/Call bell, personal items and telephone within reach/Non-slip footwear when patient is off stretcher/Provide visual clues: red socks pt came to the ED for c/o palpitations and headache.  pt also stated that is 11 weeks pregnant with twins.  pt is A/Ox3

## 2020-11-05 NOTE — OB RN DELIVERY SUMMARY - NS_BREASTINHOURA_OBGYN_ALL_OB
PT INR Test Result    PCP:  Agustín Carrillo MD      DATE OF PT/INR TEST: today November 5,2020    LOCATION OF TEST: home    CURRENT INR RESULT:  2.9             CURRENT PT RESULT (if available): N/A    PREVIOUS INR RESULT and DATE:  October 19,2020 2.1    CURRENT COUMADIN / WARFARIN DOSE PER DAY: 3 mg Thursday-Sunday 4 mg Monday-Wednesday  (PLEASE NOTE use mg NOT tablets)      IS THE PATIENT CURRENTLY TAKING LOVENOX?:  No    WHAT IS THE PATIENT'S INDICATION FOR TAKING ANTI-COAGULATION MEDICATION (Examples of indications:  Atrial Fibrillation, DVT (blod clot), Heart Valve Replacement, Pulmonary Embolism, or Unknown)? :  Blood Clot    ANY SIGNIFICANT CLINICAL STATUS CHANGES SINCE LAST INR?:  No   If yes, describe more.    ANY NEW MEDICATIONS STARTED SINCE LAST INR?:   No  If yes, prompt for med name     Caller Information       Type Contact Phone    11/05/2020 08:55 AM CST Phone (Incoming) Chuckie Stringer (Self) [phone] (H)            Turnaround time given to caller:   \"This message will be sent to [state Provider's name]. The clinical team will fulfill your request as soon as they review your message.\"   Offered, attempted but was not successful

## 2020-12-22 ENCOUNTER — EMERGENCY (EMERGENCY)
Facility: HOSPITAL | Age: 35
LOS: 1 days | Discharge: HOME | End: 2020-12-22
Attending: EMERGENCY MEDICINE
Payer: COMMERCIAL

## 2020-12-22 VITALS
WEIGHT: 169.98 LBS | TEMPERATURE: 100 F | HEIGHT: 60 IN | RESPIRATION RATE: 16 BRPM | HEART RATE: 87 BPM | OXYGEN SATURATION: 99 % | DIASTOLIC BLOOD PRESSURE: 72 MMHG | SYSTOLIC BLOOD PRESSURE: 108 MMHG

## 2020-12-22 DIAGNOSIS — Z90.721 ACQUIRED ABSENCE OF OVARIES, UNILATERAL: Chronic | ICD-10-CM

## 2020-12-22 PROCEDURE — 99283 EMERGENCY DEPT VISIT LOW MDM: CPT

## 2020-12-22 PROCEDURE — 99282 EMERGENCY DEPT VISIT SF MDM: CPT

## 2020-12-22 PROCEDURE — T1013: CPT

## 2020-12-22 RX ORDER — ACETAMINOPHEN 500 MG
650 TABLET ORAL ONCE
Refills: 0 | Status: COMPLETED | OUTPATIENT
Start: 2020-12-22 | End: 2020-12-22

## 2020-12-22 RX ADMIN — Medication 650 MILLIGRAM(S): at 15:44

## 2020-12-22 NOTE — ED PROVIDER NOTE - NSFOLLOWUPINSTRUCTIONS_ED_ALL_ED_FT
Motrin for pain   increase fluids  take pulse ox, if falls below 92 return to ED  follow up PMD in 48 hours

## 2020-12-22 NOTE — ED PROVIDER NOTE - ATTENDING CONTRIBUTION TO CARE
pleasanet and well appearing adult female with +covid, now with headache and nausea; on exam, NAD, clear conjunctiva, clear oropharynx, clear lung sounds b/l; normal gait; normal vitals, overall looks well, symptomatic treatment and precautions discussed, pulse ox monitor given

## 2020-12-22 NOTE — ED PROVIDER NOTE - CLINICAL SUMMARY MEDICAL DECISION MAKING FREE TEXT BOX
35 year old female with positive covid, c/o ha and nausea. patient looks well, VSS   supportive treatment

## 2020-12-22 NOTE — ED PROVIDER NOTE - PATIENT PORTAL LINK FT
You can access the FollowMyHealth Patient Portal offered by [facility] by registering at the following website: [email or web address]. By joining [employer]’s FollowMyHealth portal, you will also be able to view your health information using other applications (apps) compatible with our system.

## 2020-12-22 NOTE — ED PROVIDER NOTE - OBJECTIVE STATEMENT
Patient is a 35 year old female who presents c/o HA and nausea.  patient tested positive for covid on Friday after being exposed at work, was asymptomatic at that point. patient denies cough, no fever, no chills, no sob, no banegas, no chest or abdominal pain

## 2020-12-22 NOTE — ED PROVIDER NOTE - PHYSICAL EXAMINATION
Const: AOX3 nontoxic appearing, no apparent respiratory or physical distress. Stable gait   HEENT: NC/AT. Moist mucous membranes.  Eyes: RASHEL. EOMI  Neck: Soft and supple. Full ROM without pain.  Cardiac: Regular rate and regular rhythm. +S1/S2. No murmurs. Peripheral pulses 2+ and symmetric. No LE edema.  Resp: Speaking in full sentences. No evidence of respiratory distress. No wheezes, rales or rhonchi. No adventitious breath sounds   Abd: Soft, non-tender, non-distended. Normal bowel sounds in all 4 quadrants. No guarding or rebound.  Back: Spine midline and non-tender. No CVAT.  Skin: No rashes, abrasions or lacerations.  Lymph: No cervical lymphadenopathy.  Neuro: Awake, alert & oriented x 3. Moves all extremities symmetrically.

## 2020-12-23 ENCOUNTER — TRANSCRIPTION ENCOUNTER (OUTPATIENT)
Age: 35
End: 2020-12-23

## 2020-12-24 ENCOUNTER — APPOINTMENT (OUTPATIENT)
Dept: CARE COORDINATION | Facility: HOME HEALTH | Age: 35
End: 2020-12-24

## 2021-05-03 NOTE — ED PROVIDER NOTE - ALCOHOL USE HISTORY SINGLE SELECT
5/3/21  Michael Larose : 1962 Sex: female  Age: 60 y.o. Assessment and Plan:  Reta Dewitt was seen today for heartburn and shortness of breath. Diagnoses and all orders for this visit:    Mixed hyperlipidemia  -     CBC Auto Differential; Future  -     Comprehensive Metabolic Panel, Fasting; Future  -     Lipid Panel; Future  -     TSH without Reflex; Future  -     Urinalysis; Future    Epigastric pain  -     US ABDOMEN LIMITED; Future  -     EKG 12 Lead; Future  -     EKG 12 [facility]  -     famotidine (PEPCID) 20 MG tablet; Take 1 tablet by mouth 2 times daily  Ultrasound showed no evidence of gallstones. Was to rule out cardiac etiology, she is postmenopausal, and a smoker. Get [facility] consult at [facility] neurology for evaluation. Arthralgia, unspecified joint  Possible postinflammatory response to her throat infection. However all inflammatory markers were negative. Tobacco use  Continues to smoke despite risks. Urticaria, chronic  Ongoing, intermittent, aggravated with stress. Recurrent major depressive disorder, in partial remission [facility])  Managed by psychiatrist.      Return in about 4 weeks (around 2021). Chief Complaint   Patient presents with    Heartburn    Shortness of Breath       The patient states that they have had abdominal pain for the last 3 weeks. The pain is described as heartburn and is located in the epigastrium. The patient denies nausea and vomiting. Bowel movements have been normal color and consistency. No diarrhea. No black or bloody stools. The patient denies dysuria, urinary frequency, urgency and or gross hematuria. No flank pain. No fevers or chills. No chest pain but with mild dyspnea. Pain is on stent, not influenced by activity or eating. Review of Systems   Constitutional: Negative for appetite change, fatigue and unexpected weight change.    HENT: Negative for congestion, ear pain, hearing loss, sinus pain, sore throat and trouble swallowing. Eyes: Negative for photophobia, pain, discharge and redness. Respiratory: Negative for cough, chest tightness and shortness of breath. Cardiovascular: Negative for chest pain, palpitations and leg swelling. Gastrointestinal: Positive for abdominal pain. Negative for blood in stool, diarrhea, nausea and vomiting. Epigastric   Endocrine: Negative. Genitourinary: Negative for dysuria, flank pain, frequency and hematuria. Musculoskeletal: Positive for arthralgias. Negative for back pain, joint swelling and myalgias. Skin: Negative. Allergic/Immunologic: Negative. Neurological: Negative for dizziness, seizures, syncope, weakness, light-headedness, numbness and headaches. Hematological: Negative for adenopathy. Does not bruise/bleed easily. Psychiatric/Behavioral: Negative.           Current Outpatient Medications:     Omega-3 Fatty Acids (FISH OIL) 1200 MG CAPS, Take by mouth, Disp: , Rfl:     divalproex (DEPAKOTE) 500 MG DR tablet, Take 500 mg by mouth 2 times daily, Disp: , Rfl:     ARIPiprazole (ABILIFY) 15 MG tablet, Take 15 mg by mouth daily, Disp: , Rfl:     propranolol (INDERAL) 10 MG tablet, Take 10 mg by mouth 2 times daily, Disp: , Rfl:     famotidine (PEPCID) 20 MG tablet, Take 1 tablet by mouth 2 times daily, Disp: 60 tablet, Rfl: 3    atorvastatin (LIPITOR) 20 MG tablet, take 1 tablet by mouth once daily, Disp: 90 tablet, Rfl: 1    FLUoxetine (PROZAC) 20 MG capsule, , Disp: , Rfl:     Melatonin 10 MG TABS, Take 10 mg by mouth nightly as needed 2 po @@ hs, Disp: , Rfl:     hydrOXYzine (ATARAX) 50 MG tablet, Take 1 tablet by mouth See Admin Instructions 1 po bid prn anxiety (Patient taking differently: Take 50 mg by mouth See Admin Instructions 1 po tid prn anxiety), Disp: 60 tablet, Rfl: 2  Allergies   Allergen Reactions    Lansoprazole     Prilosec [Omeprazole] Hives    Naproxen Rash    Trintellix [Vortioxetine]     Zoloft [Sertraline Hcl] Rash       Past Medical History:   Diagnosis Date    Anxiety     Dental caries     Depression     Hyperlipidemia      Past Surgical History:   Procedure Laterality Date    CARPAL TUNNEL RELEASE Bilateral     CERVICAL FUSION       SECTION      x 3    DENTAL SURGERY N/A 2019    DENTAL EXAM FULL MOUTH EXTRACTIONS ALEOPOLASTY performed by Laxmi Lopez DDS at [facility] OR     Family History   Problem Relation Age of Onset    Heart Disease Mother     Diabetes Mother      Social History     Socioeconomic History    Marital status:      Spouse name: Not on file    Number of children: Not on file    Years of education: Not on file    Highest education level: Not on file   Occupational History    Not on file   Social Needs    Financial resource strain: Not hard at all   [place] insecurity     Worry: Never true     Inability: Never true   [employer] Goods needs     Medical: No     Non-medical: No   Tobacco Use    Smoking status: Current Every Day Smoker     Packs/day: 0.50     Years: 42.00     Pack years: 21.00     Types: Cigarettes    Smokeless tobacco: Never Used   Substance and Sexual Activity    Alcohol use: No    Drug use: No    Sexual activity: Not on file   Lifestyle    Physical activity     Days per week: Not on file     Minutes per session: Not on file    Stress: Not on file   Relationships    Social connections     Talks on phone: Not on file     Gets together: Not on file     Attends [employer] service: Not on file     Active member of club or organization: Not on file     Attends meetings of clubs or organizations: Not on file     Relationship status: Not on file    Intimate partner violence     Fear of current or ex partner: Not on file     Emotionally abused: Not on file     Physically abused: Not on file     Forced sexual activity: Not on file   Other Topics Concern    Not on file   Social History Narrative    Not on file Vitals:    05/03/21 0826   BP: 100/68   Pulse: 77   Resp: 18   Temp: 98.1 °F (36.7 °C)   TempSrc: Temporal   SpO2: 98%   Weight: 169 lb (76.7 kg)   Height: 5' 6\" (1.676 m)       Physical Exam  Vitals signs and nursing note reviewed. Constitutional:       Appearance: She is well-developed. HENT:      Head: Atraumatic. Right Ear: External ear normal.      Left Ear: External ear normal.      Nose: Nose normal.      Mouth/Throat:      Pharynx: No oropharyngeal exudate. Eyes:      Conjunctiva/sclera: Conjunctivae normal.      Pupils: Pupils are equal, round, and reactive to light. Neck:      Musculoskeletal: Normal range of motion and neck supple. Thyroid: No thyromegaly. Trachea: No tracheal deviation. Cardiovascular:      Rate and Rhythm: Normal rate and regular rhythm. Heart sounds: No murmur. No friction rub. No gallop. Pulmonary:      Effort: Pulmonary effort is normal. No respiratory distress. Breath sounds: Normal breath sounds. Abdominal:      General: Abdomen is flat. Bowel sounds are normal.      Palpations: Abdomen is soft. Tenderness: There is abdominal tenderness. Comments: Epigastrium   Musculoskeletal: Normal range of motion. General: Tenderness present. No deformity. Comments: Weightbearing joints, hips, knees, ankles, shoulders. Denies edema redness streaking or rash. Lymphadenopathy:      Cervical: No cervical adenopathy. Skin:     General: Skin is warm and dry. Capillary Refill: Capillary refill takes less than 2 seconds. Findings: No rash. Neurological:      Mental Status: She is alert and oriented to person, place, and time. Sensory: No sensory deficit. Motor: No abnormal muscle tone.       Coordination: Coordination normal.      Deep Tendon Reflexes: Reflexes normal.             Seen By:  Gurpreet Segal DO never

## 2021-10-07 NOTE — ED PROVIDER NOTE - NSCAREINITIATED _GEN_ER
PHYSICAL THERAPY - DAILY TREATMENT NOTE     Patient Name: Azar Maza        Date: 10/7/2021  : 1949   YES Patient  Verified  Visit #:    Insurance: Payor: Dagmar Lawton / [address] [facility] / Product Type: Commerical /      In time: 11:50 am Out time: 12:55 pm   Total Treatment Time: 65     Medicare/[facility] Time Tracking (below)   Total Timed Codes (min):  - 1:1 Treatment Time:  -     TREATMENT AREA =  Pain in left foot [M79.672]  Pain in left hip [M25.552]    SUBJECTIVE    Pain Level (on 0 to 10 scale): 4/10   Medication Changes/New allergies or changes in medical history, any new surgeries or procedures? NO    If yes, update Summary List   Subjective Functional Status/Changes:  []  No changes reported   Pt has tennis ball and working on her lower back tightness, reports MD concerned about LBP. However, pt reporting good response to massage and change in gait mechanics with improved WB status.   Functional impairments:  Constant numbness, mild edema lateral ankle, pain in lower back and L hip       OBJECTIVE  Modalities Rationale:     decrease edema, decrease inflammation, decrease pain and increase tissue extensibility to improve patient's ability to perform amb with less pain    min [] Estim, type/location:                                      []  att     []  unatt     []  w/US     []  w/ice    []  w/heat    min []  Mechanical Traction: type/lbs                   []  pro   []  sup   []  int   []  cont    []  before manual    []  after manual    min []  Ultrasound, settings/location:      min []  Iontophoresis w/ dexamethasone, location:                                               []  take home patch       []  in clinic   10 min [x]  Ice     []  Heat    location/position: Supine LE elevation with CP L posterior knee and L anterior hip    min []  Vasopneumatic Device, press/temp:    If using vaso (only need to measure limb vaso being performed on)      pre-treatment girth : post-treatment girth :       measured at (landmark location) :      min []  Other:    [x] Skin assessment post-treatment (if applicable):    [x]  intact    []  redness- no adverse reaction                  []redness - adverse reaction:        30 min Therapeutic Exercise:  [x]  See flow sheet   Rationale:      increase ROM and increase strength to improve the patients ability to perform standing, walking with less pain    15 min Therapeutic Activity: Pt ed on transfers, amb, HEP to improve long term function. Rationale:      increase ROM and increase strength to improve the patients ability to perform ADLs, amb with increased safety       10 min Manual Therapy: Technique:      [x] S/DTM []IASTM []PROM [] Passive Stretching   []manual TPR    []Jt manipulation:Gr I [] II []  III [] IV[] V[]  Treatment Area:  L lateral and posterior hip in right side lying, STM L gluteals, piriformis    Rationale:      decrease pain, increase ROM, increase tissue extensibility and decrease edema to improve patient's ability amb, and perform ADLs with less pain  The manual therapy interventions were performed at a separate and distinct time from the therapeutic activities interventions. Billed With/As:   [x] TE   [x] TA   [] Neuro   [] Self Care Patient Education: [x] Review HEP  [] Progressed/Changed HEP based on:   [] positioning   [] body mechanics   [] transfers   [] heat/ice application    [] other:        Other Objective/Functional Measures:  Pt with ed on continued use of HEP to improve symptoms. Modified bridge to PPT.    Post Treatment Pain Level (on 0 to 10) scale:   0  / 10     ASSESSMENT    Assessment/Changes in Function:   Pt with good response to progression of standing activity with addition of stance with EC 2 x 15 seconds to address safety with amb, transfers     []  See Progress Note/Recertification   Patient will continue to benefit from skilled PT services to modify and progress therapeutic interventions, address functional mobility deficits, address ROM deficits, address strength deficits, analyze and address soft tissue restrictions and instruct in home and community integration to attain remaining goals. Progress toward goals / Updated goals:    See PN.      PLAN    [x]  Upgrade activities as tolerated YES Continue plan of care   []  Discharge due to :    []  Other:      Therapist: Von Tovar PT    Date: 10/7/2021 Time: 12 pm      Future Appointments   Date Time Provider Omar Brooks   10/12/2021 11:30 AM Doretha Brito, PT [facility]   10/14/2021 11:45 AM Doretha Brito, PT [facility] Lamberto Zarco)

## 2022-01-14 NOTE — ED ADULT NURSE NOTE - AS SC BRADEN NUTRITION
Return to your regular way of eating.  Resume normal activity as tolerated, but no heavy lifting or strenuous activity for 8 weeks.  No driving for next 2 weeks and/or while on narcotic pain medication.  Complete vaginal rest, no tampons, no douching, no tub bathing, no sexual activities for 8 weeks unless otherwise instructed by your doctor.  Call your doctor with any signs and symptoms of infection such as fever, chills, nausea or vomiting.  Call your doctor with redness or swelling at the incision site, fluid leakage or wound separation.  Call your doctor if you're unable to tolerate food or have difficulty urinating.  Call your doctor if you have pain that is not relieved by your prescribed medications.  Notify your doctor with any other concerns.  Follow up with Dr. Reid in two weeks
(4) excellent

## 2022-02-09 NOTE — OB PROVIDER H&P - NSPREVIOUSLIVEBIR_OBGYN_ALL_OB
Detail Level: Detailed Quality 226: Preventive Care And Screening: Tobacco Use: Screening And Cessation Intervention: Patient screened for tobacco use and is an ex/non-smoker Quality 110: Preventive Care And Screening: Influenza Immunization: Influenza Immunization Ordered or Recommended, but not Administered due to system reason Yes

## 2022-03-08 NOTE — OB PROVIDER H&P - NSANTENATALSTERA_OBGYN_ALL_OB
[FreeTextEntry1] : I, Renetta Encarnacion wrote this note acting as a scribe for Dr. Suleiman Retana MD on Dec 14, 2021 .\par \par I, Dr. Suleiman Retana MD, ordering physician, have read and attest that all the information, medical decision making and discharge instructions within are true and accurate on 12/14/2021. 
No

## 2022-12-03 ENCOUNTER — EMERGENCY (EMERGENCY)
Facility: HOSPITAL | Age: 37
LOS: 1 days | Discharge: HOME | End: 2022-12-03
Attending: EMERGENCY MEDICINE
Payer: COMMERCIAL

## 2022-12-03 VITALS
HEART RATE: 83 BPM | RESPIRATION RATE: 15 BRPM | TEMPERATURE: 99 F | OXYGEN SATURATION: 99 % | WEIGHT: 164.91 LBS | DIASTOLIC BLOOD PRESSURE: 64 MMHG | SYSTOLIC BLOOD PRESSURE: 103 MMHG | HEIGHT: 60 IN

## 2022-12-03 DIAGNOSIS — Z90.721 ACQUIRED ABSENCE OF OVARIES, UNILATERAL: Chronic | ICD-10-CM

## 2022-12-03 LAB
ALBUMIN SERPL ELPH-MCNC: 4.2 G/DL — SIGNIFICANT CHANGE UP (ref 3.3–5.2)
ALP SERPL-CCNC: 65 U/L — SIGNIFICANT CHANGE UP (ref 40–120)
ALT FLD-CCNC: 20 U/L — SIGNIFICANT CHANGE UP
ANION GAP SERPL CALC-SCNC: 10 MMOL/L — SIGNIFICANT CHANGE UP (ref 5–17)
APPEARANCE UR: CLEAR — SIGNIFICANT CHANGE UP
AST SERPL-CCNC: 28 U/L — SIGNIFICANT CHANGE UP
BACTERIA # UR AUTO: ABNORMAL
BASOPHILS # BLD AUTO: 0.03 K/UL — SIGNIFICANT CHANGE UP (ref 0–0.2)
BASOPHILS NFR BLD AUTO: 0.4 % — SIGNIFICANT CHANGE UP (ref 0–2)
BILIRUB SERPL-MCNC: 0.4 MG/DL — SIGNIFICANT CHANGE UP (ref 0.4–2)
BILIRUB UR-MCNC: NEGATIVE — SIGNIFICANT CHANGE UP
BLD GP AB SCN SERPL QL: SIGNIFICANT CHANGE UP
BUN SERPL-MCNC: 11.4 MG/DL — SIGNIFICANT CHANGE UP (ref 8–20)
CALCIUM SERPL-MCNC: 8.8 MG/DL — SIGNIFICANT CHANGE UP (ref 8.4–10.5)
CHLORIDE SERPL-SCNC: 103 MMOL/L — SIGNIFICANT CHANGE UP (ref 96–108)
CO2 SERPL-SCNC: 22 MMOL/L — SIGNIFICANT CHANGE UP (ref 22–29)
COLOR SPEC: YELLOW — SIGNIFICANT CHANGE UP
CREAT SERPL-MCNC: 0.51 MG/DL — SIGNIFICANT CHANGE UP (ref 0.5–1.3)
DIFF PNL FLD: ABNORMAL
EGFR: 122 ML/MIN/1.73M2 — SIGNIFICANT CHANGE UP
EOSINOPHIL # BLD AUTO: 0.07 K/UL — SIGNIFICANT CHANGE UP (ref 0–0.5)
EOSINOPHIL NFR BLD AUTO: 1 % — SIGNIFICANT CHANGE UP (ref 0–6)
EPI CELLS # UR: SIGNIFICANT CHANGE UP
GLUCOSE SERPL-MCNC: 144 MG/DL — HIGH (ref 70–99)
GLUCOSE UR QL: NEGATIVE MG/DL — SIGNIFICANT CHANGE UP
HCG SERPL-ACNC: 6254 MIU/ML — HIGH
HCT VFR BLD CALC: 38.2 % — SIGNIFICANT CHANGE UP (ref 34.5–45)
HGB BLD-MCNC: 12.8 G/DL — SIGNIFICANT CHANGE UP (ref 11.5–15.5)
IMM GRANULOCYTES NFR BLD AUTO: 0.6 % — SIGNIFICANT CHANGE UP (ref 0–0.9)
KETONES UR-MCNC: NEGATIVE — SIGNIFICANT CHANGE UP
LEUKOCYTE ESTERASE UR-ACNC: ABNORMAL
LYMPHOCYTES # BLD AUTO: 1.68 K/UL — SIGNIFICANT CHANGE UP (ref 1–3.3)
LYMPHOCYTES # BLD AUTO: 23.9 % — SIGNIFICANT CHANGE UP (ref 13–44)
MCHC RBC-ENTMCNC: 30.5 PG — SIGNIFICANT CHANGE UP (ref 27–34)
MCHC RBC-ENTMCNC: 33.5 GM/DL — SIGNIFICANT CHANGE UP (ref 32–36)
MCV RBC AUTO: 91.2 FL — SIGNIFICANT CHANGE UP (ref 80–100)
MONOCYTES # BLD AUTO: 0.38 K/UL — SIGNIFICANT CHANGE UP (ref 0–0.9)
MONOCYTES NFR BLD AUTO: 5.4 % — SIGNIFICANT CHANGE UP (ref 2–14)
NEUTROPHILS # BLD AUTO: 4.84 K/UL — SIGNIFICANT CHANGE UP (ref 1.8–7.4)
NEUTROPHILS NFR BLD AUTO: 68.7 % — SIGNIFICANT CHANGE UP (ref 43–77)
NITRITE UR-MCNC: NEGATIVE — SIGNIFICANT CHANGE UP
PH UR: 7 — SIGNIFICANT CHANGE UP (ref 5–8)
PLATELET # BLD AUTO: 246 K/UL — SIGNIFICANT CHANGE UP (ref 150–400)
POTASSIUM SERPL-MCNC: 4.2 MMOL/L — SIGNIFICANT CHANGE UP (ref 3.5–5.3)
POTASSIUM SERPL-SCNC: 4.2 MMOL/L — SIGNIFICANT CHANGE UP (ref 3.5–5.3)
PROT SERPL-MCNC: 7.1 G/DL — SIGNIFICANT CHANGE UP (ref 6.6–8.7)
PROT UR-MCNC: NEGATIVE — SIGNIFICANT CHANGE UP
RBC # BLD: 4.19 M/UL — SIGNIFICANT CHANGE UP (ref 3.8–5.2)
RBC # FLD: 12.4 % — SIGNIFICANT CHANGE UP (ref 10.3–14.5)
RBC CASTS # UR COMP ASSIST: SIGNIFICANT CHANGE UP /HPF (ref 0–4)
SODIUM SERPL-SCNC: 135 MMOL/L — SIGNIFICANT CHANGE UP (ref 135–145)
SP GR SPEC: 1.01 — SIGNIFICANT CHANGE UP (ref 1.01–1.02)
UROBILINOGEN FLD QL: NEGATIVE MG/DL — SIGNIFICANT CHANGE UP
WBC # BLD: 7.04 K/UL — SIGNIFICANT CHANGE UP (ref 3.8–10.5)
WBC # FLD AUTO: 7.04 K/UL — SIGNIFICANT CHANGE UP (ref 3.8–10.5)
WBC UR QL: SIGNIFICANT CHANGE UP /HPF (ref 0–5)

## 2022-12-03 PROCEDURE — 76801 OB US < 14 WKS SINGLE FETUS: CPT | Mod: 26

## 2022-12-03 PROCEDURE — 81001 URINALYSIS AUTO W/SCOPE: CPT

## 2022-12-03 PROCEDURE — 86900 BLOOD TYPING SEROLOGIC ABO: CPT

## 2022-12-03 PROCEDURE — 96360 HYDRATION IV INFUSION INIT: CPT

## 2022-12-03 PROCEDURE — 99284 EMERGENCY DEPT VISIT MOD MDM: CPT | Mod: 25

## 2022-12-03 PROCEDURE — 84702 CHORIONIC GONADOTROPIN TEST: CPT

## 2022-12-03 PROCEDURE — 76801 OB US < 14 WKS SINGLE FETUS: CPT

## 2022-12-03 PROCEDURE — 87086 URINE CULTURE/COLONY COUNT: CPT

## 2022-12-03 PROCEDURE — 86901 BLOOD TYPING SEROLOGIC RH(D): CPT

## 2022-12-03 PROCEDURE — 99285 EMERGENCY DEPT VISIT HI MDM: CPT

## 2022-12-03 PROCEDURE — 85025 COMPLETE CBC W/AUTO DIFF WBC: CPT

## 2022-12-03 PROCEDURE — 36415 COLL VENOUS BLD VENIPUNCTURE: CPT

## 2022-12-03 PROCEDURE — 86850 RBC ANTIBODY SCREEN: CPT

## 2022-12-03 PROCEDURE — 80053 COMPREHEN METABOLIC PANEL: CPT

## 2022-12-03 RX ORDER — SODIUM CHLORIDE 9 MG/ML
1000 INJECTION INTRAMUSCULAR; INTRAVENOUS; SUBCUTANEOUS ONCE
Refills: 0 | Status: COMPLETED | OUTPATIENT
Start: 2022-12-03 | End: 2022-12-03

## 2022-12-03 RX ORDER — METOCLOPRAMIDE HCL 10 MG
10 TABLET ORAL ONCE
Refills: 0 | Status: COMPLETED | OUTPATIENT
Start: 2022-12-03 | End: 2022-12-03

## 2022-12-03 RX ADMIN — SODIUM CHLORIDE 1000 MILLILITER(S): 9 INJECTION INTRAMUSCULAR; INTRAVENOUS; SUBCUTANEOUS at 16:39

## 2022-12-03 NOTE — ED ADULT TRIAGE NOTE - CHIEF COMPLAINT QUOTE
pt states that she is 4 weeks pregnant. Pt states that she is lightly spotting since yesterday. Pt denies abdominal pain. Pt complaining of a headache.

## 2022-12-03 NOTE — ED STATDOCS - CLINICAL SUMMARY MEDICAL DECISION MAKING FREE TEXT BOX
Will check US to eval for ectopic vs . labs, reglan for headache. Possible viral illness. Will check RVP and reassess. Will check US to eval for ectopic vs threatened . labs, reglan for headache. Possible viral illness. Will check RVP and reassess.

## 2022-12-03 NOTE — ED STATDOCS - OBJECTIVE STATEMENT
38 y/o female with no significant PMHx presents to ED c/o blood In her urine since yesterday. Pt is currently 4 weeks pregnant. Pt doesn't report abdominal pain but does report a headache and chills. Pts LMP was 10/24. Pt states that shes had her headache before the hematouria started. Pt also has PSHx of an ovarian removal for a dermoid cyst and a .  Denies f/n/v/cp/sob/palpitations/ cough/rash/dizziness/abd.pain/d/c/dysuria/hematuria  int:Lili 36 y/o female with no significant PMHx presents to ED c/o vaginal spotting  since yesterday. Pt is currently 4 weeks pregnant. Pt doesn't report abdominal pain but does report a headache and chills. Pts LMP was 10/24. Pt states that shes had her headache before. Pt also has PSHx of an ovarian removal for a dermoid cyst and a .  Denies f/n/v/cp/sob/palpitations/ cough/rash/dizziness/abd.pain/d/c/dysuria/hematuria  int:Lili

## 2022-12-03 NOTE — ED STATDOCS - PATIENT PORTAL LINK FT
You can access the FollowMyHealth Patient Portal offered by [facility] by registering at the following website: [email or web address]. By joining [employer]’s FollowMyHealth portal, you will also be able to view your health information using other applications (apps) compatible with our system.

## 2022-12-03 NOTE — ED PROCEDURE NOTE - PROCEDURE ADDITIONAL DETAILS
Emergency Department Focused Ultrasound performed at patient's bedside for educational purposes. The study will have a follow up study performed or was performed in the direct supervision of an ultrasound trained attending. Indeterminate, cannot confirm IUP.

## 2022-12-03 NOTE — ED STATDOCS - NSFOLLOWUPINSTRUCTIONS_ED_ALL_ED_FT
1. Garth un seguimiento con castro obstetra en 48 horas para repetir la HCG (análisis de lissa) y programar manoj ecografía repetida.  2. Regrese al servicio de urgencias por cualquier síntoma nuevo o que empeore, yobani sangrado que empeora, coágulos, dolor abdominal intenso.

## 2022-12-03 NOTE — ED STATDOCS - PROGRESS NOTE DETAILS
Resident Divya Clifton: Reassessed pt, no complaints at this time. Discussed lab and imaging results, plan for follow-up labs in 48 hours and repeat U/S. Answered all questions.

## 2022-12-03 NOTE — ED STATDOCS - ATTENDING CONTRIBUTION TO CARE
I, Raquel Almodovar, performed the initial face to face bedside interview with this patient regarding history of present illness, review of symptoms and relevant past medical, social and family history.  I completed an independent physical examination.  I was the initial provider who evaluated this patient. I have signed out the follow up of any pending tests (i.e. labs, radiological studies) to the resident.  I have communicated the patient’s plan of care and disposition with the resident.

## 2022-12-03 NOTE — ED ADULT TRIAGE NOTE - WEIGHT METHOD
Patient called with questions regarding  Medications.  Please contact patient at [phone].   stated

## 2022-12-05 LAB
CULTURE RESULTS: SIGNIFICANT CHANGE UP
SPECIMEN SOURCE: SIGNIFICANT CHANGE UP

## 2023-07-15 NOTE — PROGRESS NOTE ADULT - ATTENDING COMMENTS
Addendum  Pt is a day 2 c section  see separate progress note
Day 1 cs  stable  routine mgmt
pt seen and evaluated  stable  dc home today  return to the office in 3 weeks
see above note
room air

## 2023-07-18 NOTE — ED ADULT NURSE NOTE - NSSISCREENINGQ3_ED_A_ED
----- Message from Liborio Spencer MD sent at 7/17/2023  9:12 AM CDT -----  Regarding: RE: screening colonoscopy  I would like to cancel the screening colonoscopy.  Was it ordered by Dr. Ford?  I don't think it is necessary.    ----- Message -----  From: Margaux Webber MA  Sent: 7/17/2023   8:18 AM CDT  To: Liborio Spencer MD  Subject: FW: screening colonoscopy                        Please advise, claire.   ----- Message -----  From: Myrna Moe LPN  Sent: 7/15/2023   2:48 PM CDT  To: [facility] Staff  Subject: screening colonoscopy                            89 year old pt with order of screening colonoscopy. Pt had recent   EGD done by .   Will  want to see pt in clinic prior to scheduling procedure?   Please adviseMyrna         
No

## 2023-09-01 ENCOUNTER — NON-APPOINTMENT (OUTPATIENT)
Age: 38
End: 2023-09-01

## 2023-09-01 ENCOUNTER — APPOINTMENT (OUTPATIENT)
Dept: NEPHROLOGY | Facility: CLINIC | Age: 38
End: 2023-09-01
Payer: MEDICAID

## 2023-09-01 VITALS
HEART RATE: 80 BPM | WEIGHT: 161 LBS | DIASTOLIC BLOOD PRESSURE: 64 MMHG | OXYGEN SATURATION: 97 % | BODY MASS INDEX: 31.44 KG/M2 | SYSTOLIC BLOOD PRESSURE: 118 MMHG

## 2023-09-01 PROCEDURE — 99203 OFFICE O/P NEW LOW 30 MIN: CPT

## 2023-09-01 NOTE — HISTORY OF PRESENT ILLNESS
[FreeTextEntry1] : HPI: Patient is a 38-year-old female with no significant PMHx here for initial visit for discussion of labs and evaluation of renal function. -She gave birth to a healthy child via  a month ago -Post procedure she had pain in the flank and went to "[facility]." -Imaging done (US followed by CT) showed a right kidney 3x3 cm complex cyst -Patient denies hematuria, weight loss, h/o nephrolithiasis -egFR 118 ml/min.  Denies dysuria, gross hematuria, SOB, CP, cough, expectoration, fever, chills, palpitation, syncope, urgency, hesitancy, swelling on feet, joint pain. No history of chronic NSAID use, nephrolithiasis or renal diseases in the family.   REVIEW OF SYSTEM:  All systems were reviewed in detail.  Pertinent positive and negatives have been mentioned in history of present illness.  The rest are negative.

## 2023-09-08 ENCOUNTER — APPOINTMENT (OUTPATIENT)
Dept: UROLOGY | Facility: CLINIC | Age: 38
End: 2023-09-08
Payer: MEDICAID

## 2023-09-08 VITALS — SYSTOLIC BLOOD PRESSURE: 105 MMHG | HEART RATE: 92 BPM | DIASTOLIC BLOOD PRESSURE: 70 MMHG

## 2023-09-08 LAB
BILIRUB UR QL STRIP: NORMAL
CLARITY UR: CLEAR
COLLECTION METHOD: NORMAL
GLUCOSE UR-MCNC: NORMAL
HCG UR QL: 0.2 EU/DL
HGB UR QL STRIP.AUTO: NORMAL
KETONES UR-MCNC: ABNORMAL
LEUKOCYTE ESTERASE UR QL STRIP: NORMAL
NITRITE UR QL STRIP: NORMAL
PH UR STRIP: 5.5
PROT UR STRIP-MCNC: NORMAL
SP GR UR STRIP: 1.03

## 2023-09-08 PROCEDURE — 81003 URINALYSIS AUTO W/O SCOPE: CPT | Mod: QW

## 2023-09-08 PROCEDURE — 99203 OFFICE O/P NEW LOW 30 MIN: CPT

## 2023-09-08 NOTE — HISTORY OF PRESENT ILLNESS
[FreeTextEntry1] : She gave birth to a healthy child via  a month ago -Post procedure she had pain in the flank and went to "[facility]." -Imaging done (US followed by CT) showed a right kidney 3x3 cm complex cyst -Patient denies hematuria, weight loss, h/o nephrolithiasis -egFR 118 ml/min.

## 2023-09-25 ENCOUNTER — APPOINTMENT (OUTPATIENT)
Dept: MRI IMAGING | Facility: CLINIC | Age: 38
End: 2023-09-25
Payer: MEDICAID

## 2023-09-25 ENCOUNTER — OUTPATIENT (OUTPATIENT)
Dept: OUTPATIENT SERVICES | Facility: HOSPITAL | Age: 38
LOS: 1 days | End: 2023-09-25

## 2023-09-25 DIAGNOSIS — Z90.721 ACQUIRED ABSENCE OF OVARIES, UNILATERAL: Chronic | ICD-10-CM

## 2023-09-25 DIAGNOSIS — N28.1 CYST OF KIDNEY, ACQUIRED: ICD-10-CM

## 2023-09-25 PROCEDURE — 74183 MRI ABD W/O CNTR FLWD CNTR: CPT | Mod: 26

## 2023-09-29 ENCOUNTER — APPOINTMENT (OUTPATIENT)
Dept: UROLOGY | Facility: CLINIC | Age: 38
End: 2023-09-29
Payer: MEDICAID

## 2023-09-29 VITALS — HEART RATE: 83 BPM | SYSTOLIC BLOOD PRESSURE: 102 MMHG | DIASTOLIC BLOOD PRESSURE: 68 MMHG

## 2023-09-29 LAB
BILIRUB UR QL STRIP: NORMAL
CLARITY UR: CLEAR
COLLECTION METHOD: NORMAL
GLUCOSE UR-MCNC: NORMAL
HCG UR QL: 1 EU/DL
HGB UR QL STRIP.AUTO: ABNORMAL
KETONES UR-MCNC: NORMAL
LEUKOCYTE ESTERASE UR QL STRIP: NORMAL
NITRITE UR QL STRIP: NORMAL
PH UR STRIP: 6
PROT UR STRIP-MCNC: ABNORMAL
SP GR UR STRIP: 1.03

## 2023-09-29 PROCEDURE — 81003 URINALYSIS AUTO W/O SCOPE: CPT | Mod: QW

## 2023-09-29 PROCEDURE — 99213 OFFICE O/P EST LOW 20 MIN: CPT

## 2023-10-04 LAB — URINE CYTOLOGY: NORMAL

## 2023-11-01 ENCOUNTER — APPOINTMENT (OUTPATIENT)
Dept: UROLOGY | Facility: CLINIC | Age: 38
End: 2023-11-01

## 2023-12-12 ENCOUNTER — APPOINTMENT (OUTPATIENT)
Dept: NEPHROLOGY | Facility: CLINIC | Age: 38
End: 2023-12-12

## 2024-01-26 NOTE — ED STATDOCS - CHPI ED SYMPTOM NEG
-- DO NOT REPLY / DO NOT REPLY ALL --  -- Message is from [facility] ([facility]) --    General Patient Message: Patient is calling because she is looking to schedule an appointment for her to get gel injections. Please contact patient to assist with scheduling as soon as possible.      Caller Information         Type Contact Phone/Fax    01/26/2024 08:34 AM CST Phone (Incoming) Trista Guillermo (Self) [phone] (M)          Alternative phone number: none    Can a detailed message be left? Yes    Message Turnaround:                no dysuria/HA, back pain, vaginal bleeding/no diarrhea/no vomiting/no fever/no hematuria/no nausea

## 2024-03-28 ENCOUNTER — APPOINTMENT (OUTPATIENT)
Dept: NEPHROLOGY | Facility: CLINIC | Age: 39
End: 2024-03-28

## 2024-05-24 ENCOUNTER — APPOINTMENT (OUTPATIENT)
Dept: UROLOGY | Facility: CLINIC | Age: 39
End: 2024-05-24
Payer: MEDICAID

## 2024-05-24 VITALS
BODY MASS INDEX: 29.45 KG/M2 | HEART RATE: 80 BPM | DIASTOLIC BLOOD PRESSURE: 71 MMHG | OXYGEN SATURATION: 99 % | HEIGHT: 60 IN | WEIGHT: 150 LBS | SYSTOLIC BLOOD PRESSURE: 110 MMHG

## 2024-05-24 LAB
BILIRUB UR QL STRIP: NORMAL
CLARITY UR: CLEAR
COLLECTION METHOD: NORMAL
GLUCOSE UR-MCNC: NORMAL
HCG UR QL: 0.2 EU/DL
HGB UR QL STRIP.AUTO: ABNORMAL
KETONES UR-MCNC: NORMAL
LEUKOCYTE ESTERASE UR QL STRIP: NORMAL
NITRITE UR QL STRIP: NORMAL
PH UR STRIP: 7
PROT UR STRIP-MCNC: NORMAL
SP GR UR STRIP: 1.03

## 2024-05-24 PROCEDURE — 99213 OFFICE O/P EST LOW 20 MIN: CPT

## 2024-05-24 PROCEDURE — 81003 URINALYSIS AUTO W/O SCOPE: CPT | Mod: QW

## 2024-06-10 ENCOUNTER — OUTPATIENT (OUTPATIENT)
Dept: OUTPATIENT SERVICES | Facility: HOSPITAL | Age: 39
LOS: 1 days | End: 2024-06-10

## 2024-06-10 ENCOUNTER — APPOINTMENT (OUTPATIENT)
Dept: MRI IMAGING | Facility: CLINIC | Age: 39
End: 2024-06-10
Payer: MEDICAID

## 2024-06-10 DIAGNOSIS — N28.1 CYST OF KIDNEY, ACQUIRED: ICD-10-CM

## 2024-06-10 DIAGNOSIS — Z90.721 ACQUIRED ABSENCE OF OVARIES, UNILATERAL: Chronic | ICD-10-CM

## 2024-06-10 PROCEDURE — 74183 MRI ABD W/O CNTR FLWD CNTR: CPT | Mod: 26

## 2024-06-26 ENCOUNTER — APPOINTMENT (OUTPATIENT)
Dept: UROLOGY | Facility: CLINIC | Age: 39
End: 2024-06-26
Payer: MEDICAID

## 2024-06-26 VITALS
WEIGHT: 149 LBS | HEART RATE: 76 BPM | SYSTOLIC BLOOD PRESSURE: 106 MMHG | BODY MASS INDEX: 29.1 KG/M2 | DIASTOLIC BLOOD PRESSURE: 68 MMHG

## 2024-06-26 DIAGNOSIS — N28.1 CYST OF KIDNEY, ACQUIRED: ICD-10-CM

## 2024-06-26 LAB
BILIRUB UR QL STRIP: ABNORMAL
CLARITY UR: CLEAR
COLLECTION METHOD: NORMAL
GLUCOSE UR-MCNC: NORMAL
HCG UR QL: 0.2 EU/DL
HGB UR QL STRIP.AUTO: ABNORMAL
KETONES UR-MCNC: ABNORMAL
LEUKOCYTE ESTERASE UR QL STRIP: NORMAL
NITRITE UR QL STRIP: NORMAL
PH UR STRIP: 6.5
PROT UR STRIP-MCNC: ABNORMAL
SP GR UR STRIP: 1.02

## 2024-06-26 PROCEDURE — 81003 URINALYSIS AUTO W/O SCOPE: CPT | Mod: QW

## 2024-06-26 PROCEDURE — 99213 OFFICE O/P EST LOW 20 MIN: CPT

## 2024-10-15 NOTE — OB RN PATIENT PROFILE - NSTRANFUSIONOBJECTION_GEN_ALL_CORE_SIUH
PDMP reviewed; no aberrant behavior identified, prescription authorized.     Medication:  Amphetamine-Dextroamphetamine  10MG   Last refill:  9/17/24 #30 tablets 0 refills   Next appt: 1/09/25   Patient has no objection to blood transfusions.

## 2024-12-18 ENCOUNTER — APPOINTMENT (OUTPATIENT)
Dept: UROLOGY | Facility: CLINIC | Age: 39
End: 2024-12-18

## 2025-04-08 NOTE — ED STATDOCS - NS ED ATTENDING STATEMENT MOD
Orders:    POC Glycohemoglobin [52437]    Accu-Chek FastClix Lancets Does not apply Misc; Apply 100 Lancets topically daily.    Glucose Blood (ACCU-CHEK GUIDE TEST) In Vitro Strip; Use 1 time per day.    
Stable. Continue current management.         
Attending with

## 2025-08-16 DIAGNOSIS — Z01.419 ENCOUNTER FOR GYNECOLOGICAL EXAMINATION (GENERAL) (ROUTINE) W/OUT ABNORMAL FINDINGS: ICD-10-CM

## 2025-08-18 ENCOUNTER — APPOINTMENT (OUTPATIENT)
Dept: OBGYN | Facility: CLINIC | Age: 40
End: 2025-08-18
Payer: MEDICAID

## 2025-08-18 VITALS
SYSTOLIC BLOOD PRESSURE: 114 MMHG | HEIGHT: 60 IN | BODY MASS INDEX: 31.41 KG/M2 | WEIGHT: 160 LBS | DIASTOLIC BLOOD PRESSURE: 80 MMHG

## 2025-08-18 DIAGNOSIS — Z30.09 ENCOUNTER FOR OTHER GENERAL COUNSELING AND ADVICE ON CONTRACEPTION: ICD-10-CM

## 2025-08-18 PROCEDURE — 99459 PELVIC EXAMINATION: CPT

## 2025-08-18 PROCEDURE — 99203 OFFICE O/P NEW LOW 30 MIN: CPT

## 2025-08-19 PROBLEM — Z30.09 STERILIZATION CONSULT: Status: ACTIVE | Noted: 2025-08-19

## 2025-09-05 ENCOUNTER — APPOINTMENT (OUTPATIENT)
Dept: UROLOGY | Facility: CLINIC | Age: 40
End: 2025-09-05

## 2025-09-05 VITALS
HEIGHT: 60 IN | WEIGHT: 160 LBS | HEART RATE: 87 BPM | DIASTOLIC BLOOD PRESSURE: 62 MMHG | BODY MASS INDEX: 31.41 KG/M2 | SYSTOLIC BLOOD PRESSURE: 107 MMHG

## 2025-09-05 LAB
BILIRUB UR QL STRIP: NORMAL
CLARITY UR: CLEAR
COLLECTION METHOD: NORMAL
GLUCOSE UR-MCNC: NORMAL
HCG UR QL: 0.2 EU/DL
HGB UR QL STRIP.AUTO: ABNORMAL
KETONES UR-MCNC: NORMAL
LEUKOCYTE ESTERASE UR QL STRIP: NORMAL
NITRITE UR QL STRIP: NORMAL
PH UR STRIP: 7
PROT UR STRIP-MCNC: NORMAL
SP GR UR STRIP: 1.02

## 2025-09-05 PROCEDURE — 99213 OFFICE O/P EST LOW 20 MIN: CPT

## 2025-09-05 PROCEDURE — 81003 URINALYSIS AUTO W/O SCOPE: CPT | Mod: QW
